# Patient Record
Sex: FEMALE | Race: OTHER | HISPANIC OR LATINO | Employment: OTHER | URBAN - METROPOLITAN AREA
[De-identification: names, ages, dates, MRNs, and addresses within clinical notes are randomized per-mention and may not be internally consistent; named-entity substitution may affect disease eponyms.]

---

## 2024-08-27 ENCOUNTER — HOSPITAL ENCOUNTER (INPATIENT)
Facility: MEDICAL CENTER | Age: 64
LOS: 3 days | End: 2024-08-30
Attending: EMERGENCY MEDICINE | Admitting: HOSPITALIST

## 2024-08-27 ENCOUNTER — APPOINTMENT (OUTPATIENT)
Dept: RADIOLOGY | Facility: MEDICAL CENTER | Age: 64
End: 2024-08-27
Attending: EMERGENCY MEDICINE

## 2024-08-27 DIAGNOSIS — I10 PRIMARY HYPERTENSION: ICD-10-CM

## 2024-08-27 DIAGNOSIS — E87.1 HYPONATREMIA: ICD-10-CM

## 2024-08-27 DIAGNOSIS — R11.2 NAUSEA AND VOMITING, UNSPECIFIED VOMITING TYPE: ICD-10-CM

## 2024-08-27 DIAGNOSIS — R00.1 BRADYCARDIA: ICD-10-CM

## 2024-08-27 PROBLEM — E86.0 DEHYDRATION: Status: ACTIVE | Noted: 2024-08-27

## 2024-08-27 PROBLEM — R94.31 QT PROLONGATION: Status: ACTIVE | Noted: 2024-08-27

## 2024-08-27 PROBLEM — E87.6 HYPOKALEMIA: Status: ACTIVE | Noted: 2024-08-27

## 2024-08-27 LAB
ALBUMIN SERPL BCP-MCNC: 4.4 G/DL (ref 3.2–4.9)
ALBUMIN/GLOB SERPL: 1.2 G/DL
ALP SERPL-CCNC: 89 U/L (ref 30–99)
ALT SERPL-CCNC: 16 U/L (ref 2–50)
AMORPH CRY #/AREA URNS HPF: PRESENT /HPF
ANION GAP SERPL CALC-SCNC: 13 MMOL/L (ref 7–16)
ANION GAP SERPL CALC-SCNC: 14 MMOL/L (ref 7–16)
ANION GAP SERPL CALC-SCNC: 15 MMOL/L (ref 7–16)
APPEARANCE UR: ABNORMAL
AST SERPL-CCNC: 25 U/L (ref 12–45)
BACTERIA #/AREA URNS HPF: ABNORMAL /HPF
BASOPHILS # BLD AUTO: 0.3 % (ref 0–1.8)
BASOPHILS # BLD: 0.02 K/UL (ref 0–0.12)
BILIRUB SERPL-MCNC: 1.1 MG/DL (ref 0.1–1.5)
BILIRUB UR QL STRIP.AUTO: NEGATIVE
BUN SERPL-MCNC: 10 MG/DL (ref 8–22)
BUN SERPL-MCNC: 10 MG/DL (ref 8–22)
BUN SERPL-MCNC: 11 MG/DL (ref 8–22)
CALCIUM ALBUM COR SERPL-MCNC: 9 MG/DL (ref 8.5–10.5)
CALCIUM SERPL-MCNC: 8.5 MG/DL (ref 8.4–10.2)
CALCIUM SERPL-MCNC: 8.8 MG/DL (ref 8.4–10.2)
CALCIUM SERPL-MCNC: 9.3 MG/DL (ref 8.4–10.2)
CHLORIDE SERPL-SCNC: 84 MMOL/L (ref 96–112)
CHLORIDE SERPL-SCNC: 85 MMOL/L (ref 96–112)
CHLORIDE SERPL-SCNC: 88 MMOL/L (ref 96–112)
CO2 SERPL-SCNC: 21 MMOL/L (ref 20–33)
CO2 SERPL-SCNC: 22 MMOL/L (ref 20–33)
CO2 SERPL-SCNC: 25 MMOL/L (ref 20–33)
COLOR UR: YELLOW
CREAT SERPL-MCNC: 0.64 MG/DL (ref 0.5–1.4)
CREAT SERPL-MCNC: 0.65 MG/DL (ref 0.5–1.4)
CREAT SERPL-MCNC: 0.71 MG/DL (ref 0.5–1.4)
EKG IMPRESSION: NORMAL
EOSINOPHIL # BLD AUTO: 0.05 K/UL (ref 0–0.51)
EOSINOPHIL NFR BLD: 0.8 % (ref 0–6.9)
EPI CELLS #/AREA URNS HPF: ABNORMAL /HPF
ERYTHROCYTE [DISTWIDTH] IN BLOOD BY AUTOMATED COUNT: 36.8 FL (ref 35.9–50)
GFR SERPLBLD CREATININE-BSD FMLA CKD-EPI: 95 ML/MIN/1.73 M 2
GFR SERPLBLD CREATININE-BSD FMLA CKD-EPI: 98 ML/MIN/1.73 M 2
GFR SERPLBLD CREATININE-BSD FMLA CKD-EPI: 99 ML/MIN/1.73 M 2
GLOBULIN SER CALC-MCNC: 3.6 G/DL (ref 1.9–3.5)
GLUCOSE SERPL-MCNC: 107 MG/DL (ref 65–99)
GLUCOSE SERPL-MCNC: 115 MG/DL (ref 65–99)
GLUCOSE SERPL-MCNC: 131 MG/DL (ref 65–99)
GLUCOSE UR STRIP.AUTO-MCNC: NEGATIVE MG/DL
HCT VFR BLD AUTO: 38.7 % (ref 37–47)
HGB BLD-MCNC: 14.2 G/DL (ref 12–16)
IMM GRANULOCYTES # BLD AUTO: 0.02 K/UL (ref 0–0.11)
IMM GRANULOCYTES NFR BLD AUTO: 0.3 % (ref 0–0.9)
KETONES UR STRIP.AUTO-MCNC: 40 MG/DL
LEUKOCYTE ESTERASE UR QL STRIP.AUTO: NEGATIVE
LIPASE SERPL-CCNC: 16 U/L (ref 11–82)
LYMPHOCYTES # BLD AUTO: 1.37 K/UL (ref 1–4.8)
LYMPHOCYTES NFR BLD: 20.6 % (ref 22–41)
MCH RBC QN AUTO: 31.4 PG (ref 27–33)
MCHC RBC AUTO-ENTMCNC: 36.7 G/DL (ref 32.2–35.5)
MCV RBC AUTO: 85.6 FL (ref 81.4–97.8)
MICRO URNS: ABNORMAL
MONOCYTES # BLD AUTO: 0.47 K/UL (ref 0–0.85)
MONOCYTES NFR BLD AUTO: 7.1 % (ref 0–13.4)
MUCOUS THREADS #/AREA URNS HPF: ABNORMAL /HPF
NEUTROPHILS # BLD AUTO: 4.73 K/UL (ref 1.82–7.42)
NEUTROPHILS NFR BLD: 70.9 % (ref 44–72)
NITRITE UR QL STRIP.AUTO: NEGATIVE
NRBC # BLD AUTO: 0 K/UL
NRBC BLD-RTO: 0 /100 WBC (ref 0–0.2)
PH UR STRIP.AUTO: 7.5 [PH] (ref 5–8)
PLATELET # BLD AUTO: 273 K/UL (ref 164–446)
PMV BLD AUTO: 11 FL (ref 9–12.9)
POTASSIUM SERPL-SCNC: 3.2 MMOL/L (ref 3.6–5.5)
POTASSIUM SERPL-SCNC: 3.5 MMOL/L (ref 3.6–5.5)
POTASSIUM SERPL-SCNC: 3.7 MMOL/L (ref 3.6–5.5)
PROT SERPL-MCNC: 8 G/DL (ref 6–8.2)
PROT UR QL STRIP: 30 MG/DL
RBC # BLD AUTO: 4.52 M/UL (ref 4.2–5.4)
RBC # URNS HPF: ABNORMAL /HPF
RBC UR QL AUTO: ABNORMAL
SODIUM SERPL-SCNC: 122 MMOL/L (ref 135–145)
SODIUM SERPL-SCNC: 122 MMOL/L (ref 135–145)
SODIUM SERPL-SCNC: 123 MMOL/L (ref 135–145)
SP GR UR STRIP.AUTO: 1.02
WBC # BLD AUTO: 6.7 K/UL (ref 4.8–10.8)
WBC #/AREA URNS HPF: ABNORMAL /HPF

## 2024-08-27 PROCEDURE — 36415 COLL VENOUS BLD VENIPUNCTURE: CPT

## 2024-08-27 PROCEDURE — 93005 ELECTROCARDIOGRAM TRACING: CPT

## 2024-08-27 PROCEDURE — 700102 HCHG RX REV CODE 250 W/ 637 OVERRIDE(OP): Performed by: HOSPITALIST

## 2024-08-27 PROCEDURE — 81001 URINALYSIS AUTO W/SCOPE: CPT

## 2024-08-27 PROCEDURE — 99223 1ST HOSP IP/OBS HIGH 75: CPT | Performed by: HOSPITALIST

## 2024-08-27 PROCEDURE — 80053 COMPREHEN METABOLIC PANEL: CPT

## 2024-08-27 PROCEDURE — 83690 ASSAY OF LIPASE: CPT

## 2024-08-27 PROCEDURE — A9270 NON-COVERED ITEM OR SERVICE: HCPCS | Performed by: HOSPITALIST

## 2024-08-27 PROCEDURE — 700105 HCHG RX REV CODE 258: Performed by: HOSPITALIST

## 2024-08-27 PROCEDURE — 700105 HCHG RX REV CODE 258: Performed by: EMERGENCY MEDICINE

## 2024-08-27 PROCEDURE — 99285 EMERGENCY DEPT VISIT HI MDM: CPT

## 2024-08-27 PROCEDURE — 700111 HCHG RX REV CODE 636 W/ 250 OVERRIDE (IP): Performed by: EMERGENCY MEDICINE

## 2024-08-27 PROCEDURE — 770020 HCHG ROOM/CARE - TELE (206)

## 2024-08-27 PROCEDURE — 94760 N-INVAS EAR/PLS OXIMETRY 1: CPT

## 2024-08-27 PROCEDURE — 85025 COMPLETE CBC W/AUTO DIFF WBC: CPT

## 2024-08-27 PROCEDURE — 74176 CT ABD & PELVIS W/O CONTRAST: CPT

## 2024-08-27 PROCEDURE — 80048 BASIC METABOLIC PNL TOTAL CA: CPT | Mod: 91

## 2024-08-27 RX ORDER — PROMETHAZINE HYDROCHLORIDE 25 MG/1
12.5-25 TABLET ORAL EVERY 4 HOURS PRN
Status: DISCONTINUED | OUTPATIENT
Start: 2024-08-27 | End: 2024-08-30 | Stop reason: HOSPADM

## 2024-08-27 RX ORDER — LOSARTAN POTASSIUM 50 MG/1
25-50 TABLET ORAL 2 TIMES DAILY
COMMUNITY

## 2024-08-27 RX ORDER — CHLORTHALIDONE 50 MG/1
25 TABLET ORAL DAILY
Status: ON HOLD | COMMUNITY
End: 2024-08-29

## 2024-08-27 RX ORDER — AMOXICILLIN 250 MG
2 CAPSULE ORAL 2 TIMES DAILY
Status: DISCONTINUED | OUTPATIENT
Start: 2024-08-27 | End: 2024-08-30 | Stop reason: HOSPADM

## 2024-08-27 RX ORDER — POTASSIUM CHLORIDE 1500 MG/1
40 TABLET, EXTENDED RELEASE ORAL ONCE
Status: COMPLETED | OUTPATIENT
Start: 2024-08-27 | End: 2024-08-27

## 2024-08-27 RX ORDER — SODIUM CHLORIDE 9 MG/ML
INJECTION, SOLUTION INTRAVENOUS CONTINUOUS
Status: ACTIVE | OUTPATIENT
Start: 2024-08-27 | End: 2024-08-28

## 2024-08-27 RX ORDER — ONDANSETRON 2 MG/ML
4 INJECTION INTRAMUSCULAR; INTRAVENOUS EVERY 4 HOURS PRN
Status: DISCONTINUED | OUTPATIENT
Start: 2024-08-27 | End: 2024-08-30 | Stop reason: HOSPADM

## 2024-08-27 RX ORDER — PROMETHAZINE HYDROCHLORIDE 25 MG/1
12.5-25 SUPPOSITORY RECTAL EVERY 4 HOURS PRN
Status: DISCONTINUED | OUTPATIENT
Start: 2024-08-27 | End: 2024-08-30 | Stop reason: HOSPADM

## 2024-08-27 RX ORDER — ONDANSETRON 4 MG/1
4 TABLET, ORALLY DISINTEGRATING ORAL ONCE
Status: COMPLETED | OUTPATIENT
Start: 2024-08-27 | End: 2024-08-27

## 2024-08-27 RX ORDER — PROPRANOLOL HCL 10 MG
5 TABLET ORAL EVERY EVENING
Status: DISCONTINUED | OUTPATIENT
Start: 2024-08-27 | End: 2024-08-30 | Stop reason: HOSPADM

## 2024-08-27 RX ORDER — SODIUM CHLORIDE, SODIUM LACTATE, POTASSIUM CHLORIDE, CALCIUM CHLORIDE 600; 310; 30; 20 MG/100ML; MG/100ML; MG/100ML; MG/100ML
1000 INJECTION, SOLUTION INTRAVENOUS ONCE
Status: COMPLETED | OUTPATIENT
Start: 2024-08-27 | End: 2024-08-27

## 2024-08-27 RX ORDER — PROPRANOLOL HCL 10 MG
5 TABLET ORAL EVERY EVENING
COMMUNITY

## 2024-08-27 RX ORDER — ACETAMINOPHEN 325 MG/1
650 TABLET ORAL EVERY 6 HOURS PRN
Status: DISCONTINUED | OUTPATIENT
Start: 2024-08-27 | End: 2024-08-30 | Stop reason: HOSPADM

## 2024-08-27 RX ORDER — ONDANSETRON 4 MG/1
4 TABLET, ORALLY DISINTEGRATING ORAL EVERY 4 HOURS PRN
Status: DISCONTINUED | OUTPATIENT
Start: 2024-08-27 | End: 2024-08-30 | Stop reason: HOSPADM

## 2024-08-27 RX ORDER — PREGABALIN 75 MG/1
75 CAPSULE ORAL EVERY EVENING
COMMUNITY

## 2024-08-27 RX ORDER — ACETAMINOPHEN 500 MG
500-1000 TABLET ORAL EVERY 6 HOURS PRN
COMMUNITY

## 2024-08-27 RX ORDER — PROCHLORPERAZINE EDISYLATE 5 MG/ML
5-10 INJECTION INTRAMUSCULAR; INTRAVENOUS EVERY 4 HOURS PRN
Status: DISCONTINUED | OUTPATIENT
Start: 2024-08-27 | End: 2024-08-30 | Stop reason: HOSPADM

## 2024-08-27 RX ORDER — POLYETHYLENE GLYCOL 3350 17 G/17G
1 POWDER, FOR SOLUTION ORAL
Status: DISCONTINUED | OUTPATIENT
Start: 2024-08-27 | End: 2024-08-30 | Stop reason: HOSPADM

## 2024-08-27 RX ORDER — HYDRALAZINE HYDROCHLORIDE 20 MG/ML
10 INJECTION INTRAMUSCULAR; INTRAVENOUS EVERY 4 HOURS PRN
Status: DISCONTINUED | OUTPATIENT
Start: 2024-08-27 | End: 2024-08-30 | Stop reason: HOSPADM

## 2024-08-27 RX ORDER — SODIUM CHLORIDE 9 MG/ML
INJECTION, SOLUTION INTRAVENOUS CONTINUOUS
Status: DISCONTINUED | OUTPATIENT
Start: 2024-08-27 | End: 2024-08-27

## 2024-08-27 RX ADMIN — POTASSIUM CHLORIDE 40 MEQ: 1500 TABLET, EXTENDED RELEASE ORAL at 14:17

## 2024-08-27 RX ADMIN — PROPRANOLOL HYDROCHLORIDE 5 MG: 10 TABLET ORAL at 17:04

## 2024-08-27 RX ADMIN — ACETAMINOPHEN 650 MG: 325 TABLET ORAL at 15:48

## 2024-08-27 RX ADMIN — SODIUM CHLORIDE: 9 INJECTION, SOLUTION INTRAVENOUS at 14:17

## 2024-08-27 RX ADMIN — ONDANSETRON 4 MG: 4 TABLET, ORALLY DISINTEGRATING ORAL at 10:45

## 2024-08-27 RX ADMIN — SODIUM CHLORIDE, POTASSIUM CHLORIDE, SODIUM LACTATE AND CALCIUM CHLORIDE 1000 ML: 600; 310; 30; 20 INJECTION, SOLUTION INTRAVENOUS at 11:41

## 2024-08-27 SDOH — ECONOMIC STABILITY: TRANSPORTATION INSECURITY
IN THE PAST 12 MONTHS, HAS THE LACK OF TRANSPORTATION KEPT YOU FROM MEDICAL APPOINTMENTS OR FROM GETTING MEDICATIONS?: NO

## 2024-08-27 SDOH — ECONOMIC STABILITY: TRANSPORTATION INSECURITY
IN THE PAST 12 MONTHS, HAS LACK OF RELIABLE TRANSPORTATION KEPT YOU FROM MEDICAL APPOINTMENTS, MEETINGS, WORK OR FROM GETTING THINGS NEEDED FOR DAILY LIVING?: NO

## 2024-08-27 ASSESSMENT — ENCOUNTER SYMPTOMS
EYE DISCHARGE: 0
MYALGIAS: 0
SHORTNESS OF BREATH: 0
VOMITING: 0
NAUSEA: 1
ABDOMINAL PAIN: 0
EYE REDNESS: 0
FEVER: 0
COUGH: 0
FOCAL WEAKNESS: 0
STRIDOR: 0
BRUISES/BLEEDS EASILY: 0
DIZZINESS: 1
HEADACHES: 1
NERVOUS/ANXIOUS: 0
CHILLS: 0
ROS GI COMMENTS: ANOREXIA
FLANK PAIN: 0

## 2024-08-27 ASSESSMENT — COGNITIVE AND FUNCTIONAL STATUS - GENERAL
SUGGESTED CMS G CODE MODIFIER MOBILITY: CK
MOVING TO AND FROM BED TO CHAIR: A LITTLE
STANDING UP FROM CHAIR USING ARMS: A LITTLE
MOBILITY SCORE: 18
DAILY ACTIVITIY SCORE: 24
WALKING IN HOSPITAL ROOM: A LITTLE
TURNING FROM BACK TO SIDE WHILE IN FLAT BAD: A LITTLE
MOVING FROM LYING ON BACK TO SITTING ON SIDE OF FLAT BED: A LITTLE
CLIMB 3 TO 5 STEPS WITH RAILING: A LITTLE
SUGGESTED CMS G CODE MODIFIER DAILY ACTIVITY: CH

## 2024-08-27 ASSESSMENT — LIFESTYLE VARIABLES
EVER HAD A DRINK FIRST THING IN THE MORNING TO STEADY YOUR NERVES TO GET RID OF A HANGOVER: NO
EVER FELT BAD OR GUILTY ABOUT YOUR DRINKING: NO
HAVE YOU EVER FELT YOU SHOULD CUT DOWN ON YOUR DRINKING: NO
HAVE PEOPLE ANNOYED YOU BY CRITICIZING YOUR DRINKING: NO
TOTAL SCORE: 0
TOTAL SCORE: 0
CONSUMPTION TOTAL: NEGATIVE
AVERAGE NUMBER OF DAYS PER WEEK YOU HAVE A DRINK CONTAINING ALCOHOL: 0
ON A TYPICAL DAY WHEN YOU DRINK ALCOHOL HOW MANY DRINKS DO YOU HAVE: 0
TOTAL SCORE: 0
HOW MANY TIMES IN THE PAST YEAR HAVE YOU HAD 5 OR MORE DRINKS IN A DAY: 0
DOES PATIENT WANT TO STOP DRINKING: CANNOT ASSESS
ALCOHOL_USE: NO

## 2024-08-27 ASSESSMENT — SOCIAL DETERMINANTS OF HEALTH (SDOH)

## 2024-08-27 ASSESSMENT — FIBROSIS 4 INDEX: FIB4 SCORE: 1.47

## 2024-08-27 ASSESSMENT — PATIENT HEALTH QUESTIONNAIRE - PHQ9
2. FEELING DOWN, DEPRESSED, IRRITABLE, OR HOPELESS: NOT AT ALL
SUM OF ALL RESPONSES TO PHQ9 QUESTIONS 1 AND 2: 0
1. LITTLE INTEREST OR PLEASURE IN DOING THINGS: NOT AT ALL

## 2024-08-27 ASSESSMENT — PAIN DESCRIPTION - PAIN TYPE
TYPE: ACUTE PAIN
TYPE: ACUTE PAIN

## 2024-08-27 NOTE — ASSESSMENT & PLAN NOTE
I will resume propranolol with hold parameters.  I will start as needed hydralazine for extreme hypertension.    8/29: Resume losartan and continue propranolol.  Continue to hold chlorthalidone.

## 2024-08-27 NOTE — ED PROVIDER NOTES
ED Provider Note    CHIEF COMPLAINT  Chief Complaint   Patient presents with    N/V     For the last 3-4 days      Loss of Appetite     For about 3-4 days     Weakness     For about 3-4 days      Lightheadedness     For the last 3-4 days       EXTERNAL RECORDS REVIEWED  [place] notable [place]    HPI/ROS  LIMITATION TO HISTORY   Select: : None  OUTSIDE HISTORIAN(S):  Daughter at bedside provides additional details that patient has multiple medications that she brought with her from [facility] but is not taking any of them she brought them as a precaution.        Rebecca Zamora is a 64 y.o. female who presents emergency department with chief complaint of nausea and vomiting.  Daughter reports that the patient's been unable to tolerate any food or fluids for several days and has had severe nausea no emesis no diarrhea no constipation denies any urinary pain she reports some pressure in her lower abdomen but denies overt abdominal pain.  Patient does have a history of hypertension for which she takes losartan.  She takes occasional propranolol for anxiety.  Reports no previous abdominal surgeries.    PAST MEDICAL HISTORY   has a past medical history of Hypertension.    SURGICAL HISTORY   has a past surgical history that includes other and other orthopedic surgery.    FAMILY HISTORY  History reviewed. No pertinent family history.    SOCIAL HISTORY  Social History     Tobacco Use    Smoking status: Never    Smokeless tobacco: Never   Vaping Use    Vaping status: Never Used   Substance and Sexual Activity    Alcohol use: Never    Drug use: Never    Sexual activity: Not on file       CURRENT MEDICATIONS  Home Medications       Reviewed by Shanthi Zeng R.N. (Registered Nurse) on 08/27/24 at 0980  Med List Status: Partial     Medication Last Dose Status        Patient Garth Taking any Medications                           ALLERGIES  Allergies   Allergen Reactions    Penicillins  "Unspecified     \"Buzzing in ears when she took it\"        PHYSICAL EXAM  VITAL SIGNS: /71   Pulse 62   Temp 36.6 °C (97.8 °F) (Temporal)   Resp 18   Ht 1.53 m (5' 0.24\")   Wt 56.2 kg (123 lb 14.4 oz)   SpO2 97%   BMI 24.01 kg/m²    Pulse ox interpretation: I interpret this pulse ox as normal.  Constitutional: Alert and oriented x 3, Distress  HEENT: Atraumatic normocephalic, pupils are equal round reactive to light extraocular movements are intact. The nares is clear, external ears are normal, mouth shows moist mucous membranes normal dentition for age  Neck: Supple, no JVD no tracheal deviation  Cardiovascular: Regular rate and rhythm no murmur rub or gallop 2+ pulses peripherally x4  Thorax & Lungs: No respiratory distress, no wheezes rales or rhonchi, No chest tenderness.   GI: Soft nontender nondistended positive bowel sounds, no peritoneal signs  Skin: Warm dry no acute rash or lesion  Musculoskeletal: Moving all extremities with full range and 5 of 5 strength no acute  deformity  Neurologic: Cranial nerves III through XII are grossly intact no sensory deficit no cerebellar dysfunction   Psychiatric: Appropriate affect for situation at this time          EKG/LABS  Results for orders placed or performed during the hospital encounter of 08/27/24   URINALYSIS CULTURE, IF INDICATED    Specimen: Urine, Clean Catch   Result Value Ref Range    Color Yellow     Character Hazy (A)     Specific Gravity 1.020 <1.035    Ph 7.5 5.0 - 8.0    Glucose Negative Negative mg/dL    Ketones 40 (A) Negative mg/dL    Protein 30 (A) Negative mg/dL    Bilirubin Negative Negative    Nitrite Negative Negative    Leukocyte Esterase Negative Negative    Occult Blood Trace (A) Negative    Micro Urine Req Microscopic    URINE MICROSCOPIC (W/UA)   Result Value Ref Range    WBC 0-2 /hpf    RBC 2-5 (A) /hpf    Bacteria Few (A) None /hpf    Epithelial Cells Few Few /hpf    Mucous Threads Many /hpf    Amorphous Crystal Present /hpf "   CBC WITH DIFFERENTIAL   Result Value Ref Range    WBC 6.7 4.8 - 10.8 K/uL    RBC 4.52 4.20 - 5.40 M/uL    Hemoglobin 14.2 12.0 - 16.0 g/dL    Hematocrit 38.7 37.0 - 47.0 %    MCV 85.6 81.4 - 97.8 fL    MCH 31.4 27.0 - 33.0 pg    MCHC 36.7 (H) 32.2 - 35.5 g/dL    RDW 36.8 35.9 - 50.0 fL    Platelet Count 273 164 - 446 K/uL    MPV 11.0 9.0 - 12.9 fL    Neutrophils-Polys 70.90 44.00 - 72.00 %    Lymphocytes 20.60 (L) 22.00 - 41.00 %    Monocytes 7.10 0.00 - 13.40 %    Eosinophils 0.80 0.00 - 6.90 %    Basophils 0.30 0.00 - 1.80 %    Immature Granulocytes 0.30 0.00 - 0.90 %    Nucleated RBC 0.00 0.00 - 0.20 /100 WBC    Neutrophils (Absolute) 4.73 1.82 - 7.42 K/uL    Lymphs (Absolute) 1.37 1.00 - 4.80 K/uL    Monos (Absolute) 0.47 0.00 - 0.85 K/uL    Eos (Absolute) 0.05 0.00 - 0.51 K/uL    Baso (Absolute) 0.02 0.00 - 0.12 K/uL    Immature Granulocytes (abs) 0.02 0.00 - 0.11 K/uL    NRBC (Absolute) 0.00 K/uL   COMP METABOLIC PANEL   Result Value Ref Range    Sodium 123 (L) 135 - 145 mmol/L    Potassium 3.2 (L) 3.6 - 5.5 mmol/L    Chloride 84 (L) 96 - 112 mmol/L    Co2 25 20 - 33 mmol/L    Anion Gap 14.0 7.0 - 16.0    Glucose 131 (H) 65 - 99 mg/dL    Bun 10 8 - 22 mg/dL    Creatinine 0.71 0.50 - 1.40 mg/dL    Calcium 9.3 8.4 - 10.2 mg/dL    Correct Calcium 9.0 8.5 - 10.5 mg/dL    AST(SGOT) 25 12 - 45 U/L    ALT(SGPT) 16 2 - 50 U/L    Alkaline Phosphatase 89 30 - 99 U/L    Total Bilirubin 1.1 0.1 - 1.5 mg/dL    Albumin 4.4 3.2 - 4.9 g/dL    Total Protein 8.0 6.0 - 8.2 g/dL    Globulin 3.6 (H) 1.9 - 3.5 g/dL    A-G Ratio 1.2 g/dL   LIPASE   Result Value Ref Range    Lipase 16 11 - 82 U/L   ESTIMATED GFR   Result Value Ref Range    GFR (CKD-EPI) 95 >60 mL/min/1.73 m 2   EKG   Result Value Ref Range    Report       [facility]    Test Date:  2024-08-27  Pt Name:    ANDERS JOHNSON ZAIDI      Department: [facility]  MRN:        [id number]                      Room:       1107  Gender:      Female                       Technician: [id number]  :        1960                   Requested By:ER TRIAGE PROTOCOL  Order #:    [id number]                    Reading MD: MUNIR ORANTES MD    Measurements  Intervals                                Axis  Rate:       59                           P:          77  WA:         153                          QRS:        69  QRSD:       110                          T:          59  QT:         460  QTc:        456    Interpretive Statements  Sinus rhythm  Borderline ST elevation, anterior leads  Baseline wander in lead(s) II,III,aVF  No previous ECG available for comparison  Electronically Signed On 2024 16:27:44 PDT by MUNIR ORANETS MD        I have independently interpreted this EKG    RADIOLOGY/PROCEDURES   I have independently interpreted the diagnostic imaging associated with this visit and am waiting the final reading from the radiologist.   My preliminary interpretation is as follows:     Radiologist interpretation:  CT-RENAL COLIC EVALUATION(A/P W/O)   Final Result         1. There is no renal or definite ureteral calculus. There is no hydronephrosis.   2. Atherosclerosis including coronary artery disease.          COURSE & MEDICAL DECISION MAKING    ASSESSMENT, COURSE AND PLAN  Care Narrative: Very pleasant 64-year-old female with dizziness nausea vomiting of the last several days.  She is found to be hyponatremic to 123.  Otherwise her workup here is fairly reassuring.  I have discussed case up to this point with hospitalist Dr. Shelton and patient's admitted in guarded condition.              FINAL DIAGNOSIS  1. Hyponatremia Active   2. Nausea and vomiting, unspecified vomiting type Active        Electronically signed by: Munir Orantes M.D.

## 2024-08-27 NOTE — H&P
Hospital Medicine History & Physical Note    Date of Service  8/27/2024    Primary Care Physician  Pcp Pt States None    Consultants  None     Code Status  Full Code    Chief Complaint  Chief Complaint   Patient presents with    N/V     For the last 3-4 days      Loss of Appetite     For about 3-4 days     Weakness     For about 3-4 days      Lightheadedness     For the last 3-4 days     History of Presenting Illness  Rebecca Zamora is a 64 y.o. female with a past medical history of primary hypertension and hyperlipidemia who presented 8/27/2024 with generalized weakness, anorexia, headache, dizziness and nausea and vomiting.  The patient has not been feeling well over the past 4 days.  She has been having progressive worsening generalized weakness dizziness with loss of appetite.  She denies having abdominal pain or diarrhea.  She denies noticing any blood in her vomitus.  In the emergency room she was found to have marked dehydration and hyponatremia with a sodium level of 123     I discussed the plan of care with emergency physician, the patient and patient family present at bedside in the emergency room    Review of Systems  Review of Systems   Constitutional:  Positive for malaise/fatigue. Negative for chills and fever.   Eyes:  Negative for discharge and redness.   Respiratory:  Negative for cough, shortness of breath and stridor.    Cardiovascular:  Negative for chest pain and leg swelling.   Gastrointestinal:  Positive for nausea. Negative for abdominal pain and vomiting.        Anorexia   Genitourinary:  Negative for flank pain.   Musculoskeletal:  Negative for myalgias.   Skin: Negative.    Neurological:  Positive for dizziness and headaches. Negative for focal weakness.   Endo/Heme/Allergies:  Does not bruise/bleed easily.   Psychiatric/Behavioral:  The patient is not nervous/anxious.      Past Medical History   has a past medical history of Hypertension.    Surgical History   has a past surgical  "history that includes other and other orthopedic surgery.     Family History  family history is not on file.      Social History   reports that she has never smoked. She has never used smokeless tobacco. She reports that she does not drink alcohol and does not use drugs.    Allergies  Allergies   Allergen Reactions    Penicillins Unspecified     \"Buzzing in ears when she took it\"      Medications  Prior to Admission Medications   Prescriptions Last Dose Informant Patient Reported? Taking?   acetaminophen (TYLENOL) 500 MG Tab 8/26/2024 at 0900 Patient Yes Yes   Sig: Take 500-1,000 mg by mouth every 6 hours as needed. Indications: Pain   chlorthalidone (HYGROTON) 50 MG Tab 8/26/2024 at 0900 Rx Bottle (For Med Information) Yes Yes   Sig: Take 25 mg by mouth every day. Pt takes half tablet (25MG)   losartan (COZAAR) 50 MG Tab 8/26/2024 at 2100 Rx Bottle (For Med Information) Yes Yes   Sig: Take 25-50 mg by mouth 2 times a day. Pt takes 50MG in AM and 25MG in the evening   pregabalin (LYRICA) 75 MG Cap > 2 nights at PM Rx Bottle (For Med Information) Yes Yes   Sig: Take 75 mg by mouth every evening.   propranolol (INDERAL) 10 MG Tab 8/26/2024 at 2100 Rx Bottle (For Med Information) Yes Yes   Sig: Take 5 mg by mouth every evening.      Facility-Administered Medications: None     Physical Exam  Temp:  [36.6 °C (97.8 °F)-36.8 °C (98.3 °F)] 36.8 °C (98.3 °F)  Pulse:  [56-65] 65  Resp:  [12-20] 16  BP: (123-160)/() 142/79  SpO2:  [97 %-100 %] 98 %  Blood Pressure: 136/68   Temperature: 36.6 °C (97.8 °F)   Pulse: (!) 57   Respiration: 13   Pulse Oximetry: 99 %     Physical Exam  Constitutional:       General: She is not in acute distress.  HENT:      Head: Normocephalic and atraumatic.      Right Ear: External ear normal.      Left Ear: External ear normal.      Nose: No congestion or rhinorrhea.      Mouth/Throat:      Mouth: Mucous membranes are moist.      Pharynx: No oropharyngeal exudate or posterior oropharyngeal " "erythema.   Eyes:      General: No scleral icterus.        Right eye: No discharge.         Left eye: No discharge.      Conjunctiva/sclera: Conjunctivae normal.      Pupils: Pupils are equal, round, and reactive to light.   Cardiovascular:      Rate and Rhythm: Bradycardia present.      Heart sounds:      No friction rub. No gallop.   Pulmonary:      Effort: Pulmonary effort is normal.      Comments: Saturating well on room air  Abdominal:      General: Abdomen is flat. There is no distension.   Musculoskeletal:         General: No swelling.      Cervical back: Neck supple. No rigidity. No muscular tenderness.      Right lower leg: No edema.      Left lower leg: No edema.   Skin:     Capillary Refill: Capillary refill takes 2 to 3 seconds.      Coloration: Skin is not jaundiced or pale.      Findings: No bruising or erythema.   Neurological:      Mental Status: She is alert and oriented to person, place, and time.      Comments: Power is 545 in both upper and lower extremities.  Fluent speech   Psychiatric:         Mood and Affect: Mood normal.         Judgment: Judgment normal.       Laboratory:  Recent Labs     08/27/24  1032   WBC 6.7   RBC 4.52   HEMOGLOBIN 14.2   HEMATOCRIT 38.7   MCV 85.6   MCH 31.4   MCHC 36.7*   RDW 36.8   PLATELETCT 273   MPV 11.0     Recent Labs     08/27/24  1032 08/27/24  1732   SODIUM 123* 122*   POTASSIUM 3.2* 3.5*   CHLORIDE 84* 85*   CO2 25 22   GLUCOSE 131* 115*   BUN 10 10   CREATININE 0.71 0.64   CALCIUM 9.3 8.8     Recent Labs     08/27/24  1032 08/27/24  1732   ALTSGPT 16  --    ASTSGOT 25  --    ALKPHOSPHAT 89  --    TBILIRUBIN 1.1  --    LIPASE 16  --    GLUCOSE 131* 115*         No results for input(s): \"NTPROBNP\" in the last 72 hours.      No results for input(s): \"TROPONINT\" in the last 72 hours.    Imaging:  CT-RENAL COLIC EVALUATION(A/P W/O)   Final Result         1. There is no renal or definite ureteral calculus. There is no hydronephrosis.   2. Atherosclerosis " including coronary artery disease.        I personally reviewed patient's EKG shows sinus bradycardia with a rate of 59.  There is no ST elevation.  There is flattening of T waves in lead aVL.  There is no block.  QTc is 456    Assessment/Plan:  Justification for Admission Status  I anticipate this patient will require at least two midnights for appropriate medical management, necessitating inpatient admission because patient has hyponatremia.  Will require intravenous fluids and close monitoring to ensure slow correction    Patient will need a telemetry bed on medical service      * Hyponatremia- (present on admission)  Assessment & Plan  Hypovolemic hyponatremia, losartan and chlorthalidone use  I will hold home losartan and chlorthalidone for now given her hyponatremia and dehydration   I expect Na to improve with IVF, and holding diuretics  I am ordering a BMPs every 4 hours, avoid over correction, aim for 8-10 meq correction in 24 hours   1200 mL free water restriction, okay for solute-containing fluids like Gatorade       QT prolongation- (present on admission)  Assessment & Plan  EKG shows QTc prolongation of 456.  Likely secondary to hypokalemia and hypomagnesemia  I will place on continuous cardiac monitoring.  Try to avoid QT prolonging medications as much as possible  Continue to monitor electrolytes, optimize potassium to be above 4 & magnesium to be above 2    I will order follow-up magnesium and potassium levels     Bradycardia- (present on admission)  Assessment & Plan  Has dizziness but this is likely secondary to severe dehydration.  EKG shows sinus bradycardia with a rate of 59.  There is no ST elevation.  There is flattening of T waves in lead aVL.  There is no block.  QTc is 456  Bradycardia is likely secondary to taking propranolol  I will monitor on telemetry    Primary hypertension- (present on admission)  Assessment & Plan  I will resume propranolol with hold parameters.  I will start as  needed hydralazine for extreme hypertension.  I will hold home losartan and chlorthalidone for now given her hyponatremia and dehydration    Hypokalemia- (present on admission)  Assessment & Plan  Replacing, checking magnesium    I will hold home diuretics for now  Continue to monitor replace as needed     Dehydration- (present on admission)  Assessment & Plan  Likely secondary to reduced oral intake, and increase in insensible loss secondary to vomiting  Encourage oral intake as tolerated, antiemetics as needed.  Intravenous hydration until adequate oral intake is achieved.       VTE prophylaxis: SCDs/TEDs and Xarelto 10 mg daily as prophylaxis

## 2024-08-27 NOTE — ASSESSMENT & PLAN NOTE
EKG shows QTc prolongation of 456.  Likely secondary to hypokalemia and hypomagnesemia  I will place on continuous cardiac monitoring.  Try to avoid QT prolonging medications as much as possible  Continue to monitor electrolytes, optimize potassium to be above 4 & magnesium to be above 2    I will order follow-up magnesium and potassium levels

## 2024-08-27 NOTE — ED NOTES
Medication history reviewed with pt. Med rec is complete.  Allergies reviewed, per pt  Interviewed pt with niece at bedside with permission from pt.    Pts niece translated for pt, pt had RX bottles at bedside. Went over all RX bottles and returned RX bottles back to pts niece at bedside.  Pt has ATORVASTATIN 20MG, but has not taken this medications in over 3 months.    Patient has not had any outpatient antibiotics in the last 30 days.    Pt is not on any anticoagulants

## 2024-08-27 NOTE — ASSESSMENT & PLAN NOTE
Has dizziness but this is likely secondary to severe dehydration.    8/28: Continue monitor on telemetry

## 2024-08-27 NOTE — ED TRIAGE NOTES
"Pt comes in w/ family   here visiting from [place]    started about 3-4 days ago N/V  unable to keep anything down   now has become lightheaded and weak due to loss of appetite   same thing happened to her last time she came to visit a couple of yrs ago   denies pain however is having \"soreness to stomach\"   "

## 2024-08-27 NOTE — ASSESSMENT & PLAN NOTE
Hypovolemic hyponatremia, losartan and chlorthalidone use    8/28: I suspect the patient with hypovolemic hyponatremia.  We have consulted nephrology, appreciate for recommendations.  For now we will continue to monitor BMP and continue with IV fluids.    8/29: Sodium seems to be improving.  We appreciate further recommendations per nephrology.

## 2024-08-27 NOTE — ASSESSMENT & PLAN NOTE
Likely secondary to reduced oral intake, and increase in insensible loss secondary to vomiting  Encourage oral intake as tolerated, antiemetics as needed.  Intravenous hydration until adequate oral intake is achieved.

## 2024-08-28 PROBLEM — Z71.89 ADVANCE CARE PLANNING: Status: ACTIVE | Noted: 2024-08-28

## 2024-08-28 PROBLEM — R53.1 GENERALIZED WEAKNESS: Status: ACTIVE | Noted: 2024-08-28

## 2024-08-28 PROBLEM — E83.42 HYPOMAGNESEMIA: Status: ACTIVE | Noted: 2024-08-28

## 2024-08-28 LAB
ALBUMIN SERPL BCP-MCNC: 3.9 G/DL (ref 3.2–4.9)
ALBUMIN/GLOB SERPL: 1.5 G/DL
ALP SERPL-CCNC: 80 U/L (ref 30–99)
ALT SERPL-CCNC: 16 U/L (ref 2–50)
ANION GAP SERPL CALC-SCNC: 11 MMOL/L (ref 7–16)
ANION GAP SERPL CALC-SCNC: 12 MMOL/L (ref 7–16)
ANION GAP SERPL CALC-SCNC: 13 MMOL/L (ref 7–16)
ANION GAP SERPL CALC-SCNC: 14 MMOL/L (ref 7–16)
ANION GAP SERPL CALC-SCNC: 14 MMOL/L (ref 7–16)
AST SERPL-CCNC: 22 U/L (ref 12–45)
BILIRUB SERPL-MCNC: 1 MG/DL (ref 0.1–1.5)
BUN SERPL-MCNC: 10 MG/DL (ref 8–22)
BUN SERPL-MCNC: 7 MG/DL (ref 8–22)
BUN SERPL-MCNC: 8 MG/DL (ref 8–22)
BUN SERPL-MCNC: 9 MG/DL (ref 8–22)
BUN SERPL-MCNC: 9 MG/DL (ref 8–22)
CALCIUM ALBUM COR SERPL-MCNC: 8.7 MG/DL (ref 8.5–10.5)
CALCIUM SERPL-MCNC: 8.1 MG/DL (ref 8.4–10.2)
CALCIUM SERPL-MCNC: 8.4 MG/DL (ref 8.4–10.2)
CALCIUM SERPL-MCNC: 8.5 MG/DL (ref 8.4–10.2)
CALCIUM SERPL-MCNC: 8.5 MG/DL (ref 8.4–10.2)
CALCIUM SERPL-MCNC: 8.6 MG/DL (ref 8.4–10.2)
CHLORIDE SERPL-SCNC: 89 MMOL/L (ref 96–112)
CHLORIDE SERPL-SCNC: 91 MMOL/L (ref 96–112)
CHLORIDE SERPL-SCNC: 92 MMOL/L (ref 96–112)
CHLORIDE SERPL-SCNC: 92 MMOL/L (ref 96–112)
CHLORIDE SERPL-SCNC: 96 MMOL/L (ref 96–112)
CO2 SERPL-SCNC: 18 MMOL/L (ref 20–33)
CO2 SERPL-SCNC: 19 MMOL/L (ref 20–33)
CO2 SERPL-SCNC: 20 MMOL/L (ref 20–33)
CO2 SERPL-SCNC: 21 MMOL/L (ref 20–33)
CO2 SERPL-SCNC: 21 MMOL/L (ref 20–33)
CREAT SERPL-MCNC: 0.59 MG/DL (ref 0.5–1.4)
CREAT SERPL-MCNC: 0.62 MG/DL (ref 0.5–1.4)
CREAT SERPL-MCNC: 0.62 MG/DL (ref 0.5–1.4)
CREAT SERPL-MCNC: 0.69 MG/DL (ref 0.5–1.4)
CREAT SERPL-MCNC: 0.71 MG/DL (ref 0.5–1.4)
ERYTHROCYTE [DISTWIDTH] IN BLOOD BY AUTOMATED COUNT: 36.4 FL (ref 35.9–50)
GFR SERPLBLD CREATININE-BSD FMLA CKD-EPI: 100 ML/MIN/1.73 M 2
GFR SERPLBLD CREATININE-BSD FMLA CKD-EPI: 95 ML/MIN/1.73 M 2
GFR SERPLBLD CREATININE-BSD FMLA CKD-EPI: 97 ML/MIN/1.73 M 2
GFR SERPLBLD CREATININE-BSD FMLA CKD-EPI: 99 ML/MIN/1.73 M 2
GFR SERPLBLD CREATININE-BSD FMLA CKD-EPI: 99 ML/MIN/1.73 M 2
GLOBULIN SER CALC-MCNC: 2.6 G/DL (ref 1.9–3.5)
GLUCOSE SERPL-MCNC: 102 MG/DL (ref 65–99)
GLUCOSE SERPL-MCNC: 110 MG/DL (ref 65–99)
GLUCOSE SERPL-MCNC: 117 MG/DL (ref 65–99)
GLUCOSE SERPL-MCNC: 174 MG/DL (ref 65–99)
GLUCOSE SERPL-MCNC: 98 MG/DL (ref 65–99)
HCT VFR BLD AUTO: 34.9 % (ref 37–47)
HGB BLD-MCNC: 13 G/DL (ref 12–16)
MAGNESIUM SERPL-MCNC: 1.7 MG/DL (ref 1.5–2.5)
MCH RBC QN AUTO: 31.3 PG (ref 27–33)
MCHC RBC AUTO-ENTMCNC: 37.2 G/DL (ref 32.2–35.5)
MCV RBC AUTO: 83.9 FL (ref 81.4–97.8)
PLATELET # BLD AUTO: 216 K/UL (ref 164–446)
PMV BLD AUTO: 10.3 FL (ref 9–12.9)
POTASSIUM SERPL-SCNC: 3.4 MMOL/L (ref 3.6–5.5)
POTASSIUM SERPL-SCNC: 3.5 MMOL/L (ref 3.6–5.5)
POTASSIUM SERPL-SCNC: 3.6 MMOL/L (ref 3.6–5.5)
POTASSIUM SERPL-SCNC: 3.8 MMOL/L (ref 3.6–5.5)
POTASSIUM SERPL-SCNC: 3.9 MMOL/L (ref 3.6–5.5)
PROT SERPL-MCNC: 6.5 G/DL (ref 6–8.2)
RBC # BLD AUTO: 4.16 M/UL (ref 4.2–5.4)
SODIUM SERPL-SCNC: 123 MMOL/L (ref 135–145)
SODIUM SERPL-SCNC: 124 MMOL/L (ref 135–145)
SODIUM SERPL-SCNC: 124 MMOL/L (ref 135–145)
SODIUM SERPL-SCNC: 125 MMOL/L (ref 135–145)
SODIUM SERPL-SCNC: 127 MMOL/L (ref 135–145)
WBC # BLD AUTO: 6.8 K/UL (ref 4.8–10.8)

## 2024-08-28 PROCEDURE — 700102 HCHG RX REV CODE 250 W/ 637 OVERRIDE(OP): Mod: JZ | Performed by: INTERNAL MEDICINE

## 2024-08-28 PROCEDURE — 97165 OT EVAL LOW COMPLEX 30 MIN: CPT

## 2024-08-28 PROCEDURE — 770020 HCHG ROOM/CARE - TELE (206)

## 2024-08-28 PROCEDURE — A9270 NON-COVERED ITEM OR SERVICE: HCPCS | Mod: JZ | Performed by: INTERNAL MEDICINE

## 2024-08-28 PROCEDURE — 83735 ASSAY OF MAGNESIUM: CPT

## 2024-08-28 PROCEDURE — 700111 HCHG RX REV CODE 636 W/ 250 OVERRIDE (IP): Performed by: INTERNAL MEDICINE

## 2024-08-28 PROCEDURE — 700105 HCHG RX REV CODE 258: Performed by: HOSPITALIST

## 2024-08-28 PROCEDURE — 700105 HCHG RX REV CODE 258: Performed by: INTERNAL MEDICINE

## 2024-08-28 PROCEDURE — 700102 HCHG RX REV CODE 250 W/ 637 OVERRIDE(OP): Performed by: HOSPITALIST

## 2024-08-28 PROCEDURE — A9270 NON-COVERED ITEM OR SERVICE: HCPCS | Performed by: HOSPITALIST

## 2024-08-28 PROCEDURE — 80053 COMPREHEN METABOLIC PANEL: CPT

## 2024-08-28 PROCEDURE — 84300 ASSAY OF URINE SODIUM: CPT

## 2024-08-28 PROCEDURE — 80048 BASIC METABOLIC PNL TOTAL CA: CPT | Mod: 91

## 2024-08-28 PROCEDURE — 36415 COLL VENOUS BLD VENIPUNCTURE: CPT

## 2024-08-28 PROCEDURE — 700111 HCHG RX REV CODE 636 W/ 250 OVERRIDE (IP): Performed by: HOSPITALIST

## 2024-08-28 PROCEDURE — 99233 SBSQ HOSP IP/OBS HIGH 50: CPT | Mod: 25 | Performed by: INTERNAL MEDICINE

## 2024-08-28 PROCEDURE — 97535 SELF CARE MNGMENT TRAINING: CPT

## 2024-08-28 PROCEDURE — 85027 COMPLETE CBC AUTOMATED: CPT

## 2024-08-28 PROCEDURE — 83935 ASSAY OF URINE OSMOLALITY: CPT

## 2024-08-28 PROCEDURE — 99254 IP/OBS CNSLTJ NEW/EST MOD 60: CPT | Performed by: INTERNAL MEDICINE

## 2024-08-28 PROCEDURE — 94760 N-INVAS EAR/PLS OXIMETRY 1: CPT

## 2024-08-28 PROCEDURE — 99497 ADVNCD CARE PLAN 30 MIN: CPT | Performed by: INTERNAL MEDICINE

## 2024-08-28 RX ORDER — MAGNESIUM SULFATE HEPTAHYDRATE 40 MG/ML
2 INJECTION, SOLUTION INTRAVENOUS ONCE
Status: COMPLETED | OUTPATIENT
Start: 2024-08-28 | End: 2024-08-28

## 2024-08-28 RX ORDER — POTASSIUM CHLORIDE 1500 MG/1
40 TABLET, EXTENDED RELEASE ORAL ONCE
Status: COMPLETED | OUTPATIENT
Start: 2024-08-28 | End: 2024-08-28

## 2024-08-28 RX ORDER — SODIUM CHLORIDE 9 MG/ML
INJECTION, SOLUTION INTRAVENOUS CONTINUOUS
Status: DISCONTINUED | OUTPATIENT
Start: 2024-08-28 | End: 2024-08-30 | Stop reason: HOSPADM

## 2024-08-28 RX ADMIN — POTASSIUM CHLORIDE 40 MEQ: 1500 TABLET, EXTENDED RELEASE ORAL at 13:51

## 2024-08-28 RX ADMIN — ACETAMINOPHEN 650 MG: 325 TABLET ORAL at 17:48

## 2024-08-28 RX ADMIN — ONDANSETRON 4 MG: 4 TABLET, ORALLY DISINTEGRATING ORAL at 11:49

## 2024-08-28 RX ADMIN — SODIUM CHLORIDE: 9 INJECTION, SOLUTION INTRAVENOUS at 16:00

## 2024-08-28 RX ADMIN — SENNOSIDES AND DOCUSATE SODIUM 2 TABLET: 50; 8.6 TABLET ORAL at 17:48

## 2024-08-28 RX ADMIN — ONDANSETRON 4 MG: 4 TABLET, ORALLY DISINTEGRATING ORAL at 04:03

## 2024-08-28 RX ADMIN — RIVAROXABAN 10 MG: 10 TABLET, FILM COATED ORAL at 17:48

## 2024-08-28 RX ADMIN — MAGNESIUM SULFATE HEPTAHYDRATE 2 G: 2 INJECTION, SOLUTION INTRAVENOUS at 13:51

## 2024-08-28 RX ADMIN — SODIUM CHLORIDE: 9 INJECTION, SOLUTION INTRAVENOUS at 03:37

## 2024-08-28 RX ADMIN — PROPRANOLOL HYDROCHLORIDE 5 MG: 10 TABLET ORAL at 17:48

## 2024-08-28 ASSESSMENT — ENCOUNTER SYMPTOMS
COUGH: 0
SHORTNESS OF BREATH: 0
FALLS: 0
NERVOUS/ANXIOUS: 0
DOUBLE VISION: 0
ROS GI COMMENTS: NO APPETITE
BACK PAIN: 0
CHILLS: 0
ABDOMINAL PAIN: 0
HEARTBURN: 0
WEAKNESS: 1
PALPITATIONS: 0
BLURRED VISION: 0
NAUSEA: 1
FEVER: 0
HEADACHES: 0

## 2024-08-28 ASSESSMENT — LIFESTYLE VARIABLES: SUBSTANCE_ABUSE: 0

## 2024-08-28 ASSESSMENT — COGNITIVE AND FUNCTIONAL STATUS - GENERAL
SUGGESTED CMS G CODE MODIFIER DAILY ACTIVITY: CH
DAILY ACTIVITIY SCORE: 24

## 2024-08-28 ASSESSMENT — PAIN DESCRIPTION - PAIN TYPE
TYPE: ACUTE PAIN
TYPE: ACUTE PAIN

## 2024-08-28 ASSESSMENT — ACTIVITIES OF DAILY LIVING (ADL): TOILETING: INDEPENDENT

## 2024-08-28 ASSESSMENT — GAIT ASSESSMENTS: DISTANCE (FEET): 25

## 2024-08-28 ASSESSMENT — FIBROSIS 4 INDEX: FIB4 SCORE: 1.63

## 2024-08-28 NOTE — PROGRESS NOTES
Telemetry Shift Summary    Rhythm SB-SR  HR Range 46-61  Ectopy R PVC, Coup, ST elevation in V1 V2     Measurements 0.16/0.08/0.44      Normal Values  Rhythm SR  HR Range:   Measurements: 0.12-0.20/0.06-0.10/0.30-0.52

## 2024-08-28 NOTE — THERAPY
"Occupational Therapy   Initial Evaluation     Patient Name: Rebecca Zamora  Age:  64 y.o., Sex:  female  Medical Record #: [id number]  Today's Date: 8/28/2024          Assessment  Patient is 64 y.o. female presented 8/27 w/ generalized weakness, anorexia, headache, dizziness and vomiting.  Admitted with a diagnosis of hyponatremia.  PMH - HTN, HLD,  Pt resides in [place] and is visiting her sister in [place], [place].  Her home in [place] is 2 stories, PLOF Indep for ADL's, transfers and ambulation w/out a device.  Before she returns home she will stay with her sister in [place], [place].  Sister resides in a mobile home w/ 4 steps and Left rail.  Sister is available to assist as needed while in [place] and extensive family available to assist upon return to [place].  Pt demonstrated Indep w/ bed mobility, transfers, functional mobility w/out a device in room and ADL's.  No LOB, coordination and strength WFL's.      Plan    DC Equipment Recommendations: (P) None  Discharge Recommendations: (P) Anticipate that the patient will have no further occupational therapy needs after discharge from the hospital     Subjective  \"The smell of food makes me want to throw up\"  Pt was alert and cooperative w/ tx.     Objective       08/28/24 0910    Services   Is patient using  services for this encounter? Yes   Language Interpreted Korean   Initial Contact Note    Initial Contact Note Order Received and Verified, Occupational Therapy Evaluation in Progress with Full Report to Follow.   Prior Living Situation   Prior Services None   Housing / Facility 2 Story House   Steps Into Home 2   Steps In Home   (FOS)   Bathroom Set up Bathtub / Shower Combination   Equipment Owned None   Lives with - Patient's Self Care Capacity Alone and Able to Care For Self   Comments Pt resides in [place] and is visiting her sister in [place], [place].  Her home in [place] is 2 stories, PLOF Indep for ADL's, transfers and ambulation w/out a device.  " Before she returns home she will stay with her sister in [place], [place].  Sister resides in a mobile home w/ 4 steps and Left rail.  Sister is available to assist as needed while in [place] and extensive family available to assist upon return to [place].   Prior Level of ADL Function   Self Feeding Independent   Grooming / Hygiene Independent   Bathing Independent   Dressing Independent   Toileting Independent   Prior Level of IADL Function   Medication Management Independent   Laundry Independent   Kitchen Mobility Independent   Finances Independent   Home Management Independent   Shopping Independent   Prior Level Of Mobility Independent Without Device in Community;Independent With Steps in Community;Independent Without Device in Home;Independent With Steps in Home   Driving / Transportation Driving Independent   History of Falls   History of Falls No   Vitals   Pulse Oximetry 95 %   O2 (LPM) 0   O2 Delivery Device Room air w/o2 available   Pain   Intervention Declines   Cognition    Cognition / Consciousness WDL   Level of Consciousness Alert   Passive ROM Upper Body   Passive ROM Upper Body WDL   Active ROM Upper Body   Active ROM Upper Body  WDL   Dominant Hand Right   Strength Upper Body   Upper Body Strength  WDL   Upper Body Muscle Tone   Upper Body Muscle Tone  WDL   Coordination Upper Body   Coordination WDL   Balance Assessment   Sitting Balance (Static) Good   Sitting Balance (Dynamic) Good   Standing Balance (Static) Fair +   Standing Balance (Dynamic) Fair +   Weight Shift Sitting Good   Weight Shift Standing Good   Bed Mobility    Supine to Sit Independent   Sit to Supine Independent   Scooting Independent   ADL Assessment   Grooming Standing;Independent   Upper Body Dressing Independent   Lower Body Dressing Independent   Toileting Independent   How much help from another person does the patient currently need...   Putting on and taking off regular lower body clothing? 4   Bathing (including washing, rinsing,  and drying)? 4   Toileting, which includes using a toilet, bedpan, or urinal? 4   Putting on and taking off regular upper body clothing? 4   Taking care of personal grooming such as brushing teeth? 4   Eating meals? 4   6 Clicks Daily Activity Score 24   Functional Mobility   Sit to Stand Supervised   Bed, Chair, Wheelchair Transfer Supervised   Toilet Transfers Supervised   Transfer Method Stand Step   Mobility Ambulation w/out a device   Distance (Feet) 25   # of Times Distance was Traveled 2   Edema / Skin Assessment   Edema / Skin  Not Assessed   Education Group   Education Provided Home Safety;Transfers;Role of Occupational Therapist;Activities of Daily Living   Role of Occupational Therapist Patient Response Patient;Acceptance;;Explanation;Verbal Demonstration   Home Safety Patient Response Patient;Acceptance;Explanation;Demonstration;;Verbal Demonstration   Transfers Patient Response Patient;Acceptance;Explanation;Demonstration;;Teach Back;Verbal Demonstration;Action Demonstration   ADL Patient Response Patient;Acceptance;Explanation;Demonstration;;Teach Back;Verbal Demonstration;Action Demonstration   Anticipated Discharge Equipment and Recommendations   DC Equipment Recommendations None   Discharge Recommendations Anticipate that the patient will have no further occupational therapy needs after discharge from the hospital

## 2024-08-28 NOTE — CARE PLAN
The patient is Stable - Low risk of patient condition declining or worsening    Shift Goals  Clinical Goals: monitor sodium  Patient Goals: decreased nausea  Family Goals: updates    Progress made toward(s) clinical / shift goals:    Problem: Knowledge Deficit - Standard  Goal: Patient and family/care givers will demonstrate understanding of plan of care, disease process/condition, diagnostic tests and medications  Description: Target End Date:  1-3 days or as soon as patient condition allows    Document in Patient Education    1.  Patient and family/caregiver oriented to unit, equipment, visitation policy and means for communicating concern  2.  Complete/review Learning Assessment  3.  Assess knowledge level of disease process/condition, treatment plan, diagnostic tests and medications  4.  Explain disease process/condition, treatment plan, diagnostic tests and medications  Outcome: Progressing  Note: Plan of care discussed with patient and family. Patient and family understand the plan of care     Problem: Pain - Standard  Goal: Alleviation of pain or a reduction in pain to the patient’s comfort goal  Description: Target End Date:  Prior to discharge or change in level of care    Document on Vitals flowsheet    1.  Document pain using the appropriate pain scale per order or unit policy  2.  Educate and implement non-pharmacologic comfort measures (i.e. relaxation, distraction, massage, cold/heat therapy, etc.)  3.  Pain management medications as ordered  4.  Reassess pain after pain med administration per policy  5.  If opiods administered assess patient's response to pain medication is appropriate per POSS sedation scale  6.  Follow pain management plan developed in collaboration with patient and interdisciplinary team (including palliative care or pain specialists if applicable)  Outcome: Progressing  Note: Patient remained free of pain. Patient did not require pain medication.      Problem: Fall Risk  Goal:  Patient will remain free from falls  Description: Target End Date:  Prior to discharge or change in level of care    Document interventions on the Yajaira Vasquez Fall Risk Assessment    1.  Assess for fall risk factors  2.  Implement fall precautions  Outcome: Progressing  Note: Patient remains free of falls this shift.        Patient is not progressing towards the following goals:

## 2024-08-28 NOTE — CONSULTS
DATE OF SERVICE:  08/28/2024     REQUESTING PHYSICIAN:  Kin Gutierrez MD     REASON FOR CONSULTATION:  Hyponatremia.     The patient seen and examined, medical records were reviewed.     HISTORY OF PRESENT ILLNESS:  The patient is a very pleasant 64-year-old   Czech speaking lady, communication was through the  from the iPad   systems, presented to the hospital yesterday with nausea and vomiting for the   last 3 days with decreased p.o. intake, the patient also reports orthostatic   hypotension symptoms including lightheadedness and dizziness, the patient was   found to be hyponatremic with initial sodium at 123.  The patient was treated   with IV fluid, but her sodium went down to 122.     The patient has no headache or change in vision.  She still has occasional   nausea.     The patient has a history of hypertension. She was taking antihypertensive   medication at home including chlorthalidone.     This morning, the patient has no chest pain or shortness of breath.     PAST MEDICAL HISTORY:  Significant for hypertension.     ALLERGIES:  REPORTED ALLERGY TO PENICILLIN.     SOCIAL HISTORY:  The patient has no smoking history.     FAMILY HISTORY:  No known renal disease.     MEDICATIONS:  Reviewed.     REVIEW OF SYSTEMS:  All systems were reviewed; they were negative except   outlined in the history of present illness.     PHYSICAL EXAMINATION:    GENERAL:  The patient appears ill, but no apparent distress.  VITAL SIGNS:  Showed blood pressure of 132/70, heart rate was 61, respiratory   rate was 18.  HEENT:  Normocephalic, atraumatic.  Sclerae are anicteric.  Pupils are   reactive.  Nose normal.  Mucous membranes moist.  NECK:  No lymphadenopathy, no JVD, no thyroid mass.  CHEST:  Normal.  LUNGS:  Clear to auscultation.  HEART:  S1, S2.  ABDOMEN:  Soft, nontender.  No hepatosplenomegaly.  There is no inguinal   lymphadenopathy.  EXTREMITIES:  There is no lower extremity edema.  SKIN:  No skin  rash.  NEUROLOGIC:  The patient is alert and oriented x3.  MOOD:  The patient is anxious.     LABORATORY DATA:  Her recent labs from today were reviewed.     DIAGNOSTIC DATA:  The patient had abdominal CT scan done yesterday, I reviewed   the image myself.  Her kidneys have no hydronephrosis.     ASSESSMENT:  1.  Hyponatremia.  The etiology is most likely volume depletion.  Her slight   decrease in sodium after IV fluid is probably suggestive of high amount of ADH   secondary to hypotension.  Also, there is a component of the patient was on   hydrochlorothiazide.  2.  Hypokalemia.  3.  Nausea and vomiting.  4.  Hypertension.     PLAN:  1.  There is no acute need for renal placement therapy.  2.  Continue to hold chlorthalidone.  3.  Continue fluid resuscitation with normal saline until the patient is able   to take oral diet.  4.  Serial sodium level check.  5.  Avoid fast correction.  6.  Daily labs.  7.  Prognosis is guarded.  8.  Plan discussed in detail with Dr. Jackson Gutierrez.        ______________________________  FADI NAJJAR, MD FN/SHAUN    DD:  08/28/2024 14:30  DT:  08/28/2024 14:45    Job#:  [id number]

## 2024-08-28 NOTE — CARE PLAN
The patient is Stable - Low risk of patient condition declining or worsening    Shift Goals  Clinical Goals: Monitor sodium  Patient Goals: Reduce nausea  Family Goals: updates    Progress made toward(s) clinical / shift goals:    Problem: Knowledge Deficit - Standard  Goal: Patient and family/care givers will demonstrate understanding of plan of care, disease process/condition, diagnostic tests and medications  Outcome: Progressing  Note: Reviewed POC ; discussed and provided eduction on pain management, medications, IV fluids, admission process, and labs.      Problem: Pain - Standard  Goal: Alleviation of pain or a reduction in pain to the patient’s comfort goal  Outcome: Progressing  Note: Discussed pharmacological and non-pharmacological pain management interventions.      Problem: Fall Risk  Goal: Patient will remain free from falls  Outcome: Progressing  Note: Fall precautions implemented.        Patient is not progressing towards the following goals:

## 2024-08-28 NOTE — PROGRESS NOTES
Telemetry Shift Summary    Rhythm SR   HR Range 60  Ectopy n/a  Measurements 0.16/0.08/0.42    Normal Values  Rhythm SR  HR Range:   Measurements: 0.12-0.20/0.06-0.10/0.30-0.52

## 2024-08-28 NOTE — PROGRESS NOTES
4 Eyes Skin Assessment Completed by KAIDEN Flores and KAIDEN Rodriguez.    Head WDL  Ears WDL  Nose WDL  Mouth WDL  Neck WDL  Breast/Chest WDL  Shoulder Blades WDL  Spine WDL  (R) Arm/Elbow/Hand WDL  (L) Arm/Elbow/Hand WDL  Abdomen WDL  Groin WDL  Scrotum/Coccyx/Buttocks WDL  (R) Leg Scar  (L) Leg WDL  (R) Heel/Foot/Toe Scar  (L) Heel/Foot/Toe WDL          Devices In Places Tele Box and Blood Pressure Cuff      Interventions In Place Pillows    Possible Skin Injury No    Pictures Uploaded Into Epic N/A  Wound Consult Placed N/A  RN Wound Prevention Protocol Ordered No

## 2024-08-28 NOTE — PROGRESS NOTES
"Hospital Medicine Daily Progress Note    Date of Service  8/28/2024    Chief Complaint  Rebecca Zamora is a 64 y.o. female admitted 8/27/2024 with generalized weakness    Hospital Course  As per chart review:  \"64 y.o. female with a past medical history of primary hypertension and hyperlipidemia who presented 8/27/2024 with generalized weakness, anorexia, headache, dizziness and nausea and vomiting.  The patient has not been feeling well over the past 4 days.  She has been having progressive worsening generalized weakness dizziness with loss of appetite.  She denies having abdominal pain or diarrhea.  She denies noticing any blood in her vomitus.  In the emergency room she was found to have marked dehydration and hyponatremia with a sodium level of 123.\"    Interval Problem Update  8/28: Patient seen at bedside this morning.  Patient laying in bed, still complaining of generalized weakness, no appetite.  Still with hyponatremia.  Will continue with IV fluids.  We have consulted nephrology, we appreciate further recommendations.  We have also ordered urine sodium and urine osmolality.    I have discussed this patient's plan of care and discharge plan at IDT rounds today with Case Management, Nursing, Nursing leadership, and other members of the IDT team.    Consultants/Specialty  nephrology    Code Status  Full Code    Disposition  The patient is not medically cleared for discharge to home or a post-acute facility.        Review of Systems  Review of Systems   Constitutional:  Positive for malaise/fatigue. Negative for chills and fever.   HENT:  Negative for hearing loss and nosebleeds.    Eyes:  Negative for blurred vision and double vision.   Respiratory:  Negative for cough and shortness of breath.    Cardiovascular:  Negative for chest pain and palpitations.   Gastrointestinal:  Positive for nausea. Negative for abdominal pain and heartburn.        No appetite   Genitourinary:  Negative for dysuria and " urgency.   Musculoskeletal:  Negative for back pain and falls.   Skin:  Negative for itching and rash.   Neurological:  Positive for weakness (generalized). Negative for headaches.   Psychiatric/Behavioral:  Negative for substance abuse. The patient is not nervous/anxious.    All other systems reviewed and are negative.       Physical Exam  Temp:  [36.8 °C (98.2 °F)-36.9 °C (98.5 °F)] 36.8 °C (98.3 °F)  Pulse:  [55-65] 61  Resp:  [16-20] 18  BP: (121-149)/(67-88) 132/70  SpO2:  [95 %-98 %] 96 %    Physical Exam  Vitals and nursing note reviewed.   Constitutional:       General: She is not in acute distress.     Appearance: She is ill-appearing.   HENT:      Head: Normocephalic and atraumatic.      Right Ear: External ear normal.      Left Ear: External ear normal.      Mouth/Throat:      Pharynx: No oropharyngeal exudate or posterior oropharyngeal erythema.   Eyes:      General:         Right eye: No discharge.         Left eye: No discharge.   Cardiovascular:      Rate and Rhythm: Normal rate and regular rhythm.      Pulses: Normal pulses.      Heart sounds: No murmur heard.     No gallop.   Pulmonary:      Effort: Pulmonary effort is normal. No respiratory distress.      Breath sounds: Normal breath sounds. No wheezing or rhonchi.   Abdominal:      General: Bowel sounds are normal. There is no distension.      Palpations: Abdomen is soft.      Tenderness: There is no abdominal tenderness. There is no guarding.   Musculoskeletal:         General: No swelling or tenderness. Normal range of motion.      Cervical back: Normal range of motion and neck supple. No tenderness.   Skin:     General: Skin is warm and dry.   Neurological:      General: No focal deficit present.      Mental Status: She is alert and oriented to person, place, and time. Mental status is at baseline.      Motor: No weakness.   Psychiatric:         Mood and Affect: Mood normal.         Behavior: Behavior normal.         Fluids    Intake/Output  Summary (Last 24 hours) at 8/28/2024 1318  Last data filed at 8/28/2024 1142  Gross per 24 hour   Intake 295.48 ml   Output 500 ml   Net -204.52 ml        Laboratory  Recent Labs     08/27/24  1032 08/28/24  0144   WBC 6.7 6.8   RBC 4.52 4.16*   HEMOGLOBIN 14.2 13.0   HEMATOCRIT 38.7 34.9*   MCV 85.6 83.9   MCH 31.4 31.3   MCHC 36.7* 37.2*   RDW 36.8 36.4   PLATELETCT 273 216   MPV 11.0 10.3     Recent Labs     08/27/24  2213 08/28/24  0144 08/28/24  0700   SODIUM 122* 124* 123*   POTASSIUM 3.7 3.6 3.5*   CHLORIDE 88* 89* 91*   CO2 21 21 18*   GLUCOSE 107* 98 110*   BUN 11 9 9   CREATININE 0.65 0.62 0.59   CALCIUM 8.5 8.6 8.5                   Imaging  CT-RENAL COLIC EVALUATION(A/P W/O)   Final Result         1. There is no renal or definite ureteral calculus. There is no hydronephrosis.   2. Atherosclerosis including coronary artery disease.           Assessment/Plan  * Hyponatremia- (present on admission)  Assessment & Plan  Hypovolemic hyponatremia, losartan and chlorthalidone use    8/28: I suspect the patient with hypovolemic hyponatremia.  We have consulted nephrology, appreciate for recommendations.  For now we will continue to monitor BMP and continue with IV fluids.    Advance care planning  Assessment & Plan  8/28: I discussed with patient for at least 16 minutes further goals of care.  This included CODE STATUS which includes full code and DNR/DNI.  Also discussed further treatment goals.  For now the patient would like to have full treatment options.  This includes invasive and noninvasive procedures as needed.  In the event that the patient cannot make decisions for herself she would like her son Kali to make decisions for her.    Hypomagnesemia  Assessment & Plan  Replace as needed  monitor    Bradycardia- (present on admission)  Assessment & Plan  Has dizziness but this is likely secondary to severe dehydration.    8/28: Continue monitor on telemetry    Primary hypertension- (present on  admission)  Assessment & Plan  I will resume propranolol with hold parameters.  I will start as needed hydralazine for extreme hypertension.  I will hold home losartan and chlorthalidone for now given her hyponatremia and dehydration    Hypokalemia- (present on admission)  Assessment & Plan  Replace as needed  monitor    Dehydration- (present on admission)  Assessment & Plan  Likely secondary to reduced oral intake, and increase in insensible loss secondary to vomiting  Encourage oral intake as tolerated, antiemetics as needed.  Intravenous hydration until adequate oral intake is achieved.          VTE prophylaxis: Xarelto    I have performed a physical exam and reviewed and updated ROS and Plan today (8/28/2024). In review of yesterday's note (8/27/2024), there are no changes except as documented above.      I spend at least 51 minutes providing care for this patient.  This included face-to-face interview, physical examination.  Review of lab work including CBC, BMP, magnesium, CMP.  Consulting and discussing with nephrology.  Discussing with multidisciplinary team including case management, nursing staff and pharmacy.  Creating plan of care, reviewing orders.    Moreover, I discussed with patient for at least 16 minutes further goals of care.  This included CODE STATUS which includes full code and DNR/DNI.  Also discussed further treatment goals.  For now the patient would like to have full treatment options.  This includes invasive and noninvasive procedures as needed.  In the event that the patient cannot make decisions for herself she would like her son Kali to make decisions for her.

## 2024-08-28 NOTE — PROGRESS NOTES
Received report from Jennifer RICHEY.     Bed is locked and in lowest position. Fall and Safety precautions in place. Patient awake in bed. Call light within reach. Patent verbalizes No other needs at this time.

## 2024-08-28 NOTE — CARE PLAN
The patient is Stable - Low risk of patient condition declining or worsening    Shift Goals  Clinical Goals: Monitor sodium, controll nausea  Patient Goals: increase appetite, decrease nausea  Family Goals: updates    Progress made toward(s) clinical / shift goals:    Problem: Knowledge Deficit - Standard  Goal: Patient and family/care givers will demonstrate understanding of plan of care, disease process/condition, diagnostic tests and medications  Outcome: Progressing  Note: Reviewed POC; discussed and provided education on IV fluids, Labs, medications, and telemetry monitoring.      Problem: Fall Risk  Goal: Patient will remain free from falls  Outcome: Progressing  Note: Fall precautions in place. Patient free from falls.        Patient is not progressing towards the following goals:

## 2024-08-28 NOTE — THERAPY
Physical Therapy Contact Note    Patient Name: Rebecca Zamora  Age:  64 y.o., Sex:  female  Medical Record #: [id number]  Today's Date: 8/28/2024    PT orders received and chart reviewed. After discussion with RN and OT staff,  the patient currently does not demonstrate or present with any deficits that require the need for skilled acute physical therapy services in the acute care setting at this time.   Orders will be completed at this time.   Please reorder if there is a change in the patient's current medical status that will require skilled PT intervention.   Thank you.

## 2024-08-28 NOTE — PROGRESS NOTES
System downtime:     At 0840: Per MD Paz Verbal order, resume continuous IV NS 0.9% at 75 ml/hr and Q4 Sodium lab draws.

## 2024-08-28 NOTE — ASSESSMENT & PLAN NOTE
8/28: I discussed with patient for at least 16 minutes further goals of care.  This included CODE STATUS which includes full code and DNR/DNI.  Also discussed further treatment goals.  For now the patient would like to have full treatment options.  This includes invasive and noninvasive procedures as needed.  In the event that the patient cannot make decisions for herself she would like her son Kali to make decisions for her.

## 2024-08-28 NOTE — PROGRESS NOTES
System Downtime:    At 0700: Received report from Jennifer RICHEY.   Bed is locked and in lowest position. Fall and Safety precautions in place. Reviewed POC. Call light within reach. Patient verbalizes No other needs at this time.

## 2024-08-29 LAB
ANION GAP SERPL CALC-SCNC: 10 MMOL/L (ref 7–16)
ANION GAP SERPL CALC-SCNC: 10 MMOL/L (ref 7–16)
ANION GAP SERPL CALC-SCNC: 12 MMOL/L (ref 7–16)
ANION GAP SERPL CALC-SCNC: 14 MMOL/L (ref 7–16)
BUN SERPL-MCNC: 7 MG/DL (ref 8–22)
BUN SERPL-MCNC: 8 MG/DL (ref 8–22)
BUN SERPL-MCNC: 8 MG/DL (ref 8–22)
BUN SERPL-MCNC: 9 MG/DL (ref 8–22)
CALCIUM SERPL-MCNC: 8.1 MG/DL (ref 8.4–10.2)
CALCIUM SERPL-MCNC: 8.1 MG/DL (ref 8.4–10.2)
CALCIUM SERPL-MCNC: 8.2 MG/DL (ref 8.4–10.2)
CALCIUM SERPL-MCNC: 8.4 MG/DL (ref 8.4–10.2)
CHLORIDE SERPL-SCNC: 95 MMOL/L (ref 96–112)
CHLORIDE SERPL-SCNC: 95 MMOL/L (ref 96–112)
CHLORIDE SERPL-SCNC: 97 MMOL/L (ref 96–112)
CHLORIDE SERPL-SCNC: 98 MMOL/L (ref 96–112)
CO2 SERPL-SCNC: 18 MMOL/L (ref 20–33)
CO2 SERPL-SCNC: 20 MMOL/L (ref 20–33)
CO2 SERPL-SCNC: 21 MMOL/L (ref 20–33)
CO2 SERPL-SCNC: 22 MMOL/L (ref 20–33)
CREAT SERPL-MCNC: 0.58 MG/DL (ref 0.5–1.4)
CREAT SERPL-MCNC: 0.59 MG/DL (ref 0.5–1.4)
CREAT SERPL-MCNC: 0.6 MG/DL (ref 0.5–1.4)
CREAT SERPL-MCNC: 0.82 MG/DL (ref 0.5–1.4)
EKG IMPRESSION: NORMAL
ERYTHROCYTE [DISTWIDTH] IN BLOOD BY AUTOMATED COUNT: 40.6 FL (ref 35.9–50)
GFR SERPLBLD CREATININE-BSD FMLA CKD-EPI: 100 ML/MIN/1.73 M 2
GFR SERPLBLD CREATININE-BSD FMLA CKD-EPI: 100 ML/MIN/1.73 M 2
GFR SERPLBLD CREATININE-BSD FMLA CKD-EPI: 101 ML/MIN/1.73 M 2
GFR SERPLBLD CREATININE-BSD FMLA CKD-EPI: 80 ML/MIN/1.73 M 2
GLUCOSE SERPL-MCNC: 105 MG/DL (ref 65–99)
GLUCOSE SERPL-MCNC: 110 MG/DL (ref 65–99)
GLUCOSE SERPL-MCNC: 122 MG/DL (ref 65–99)
GLUCOSE SERPL-MCNC: 90 MG/DL (ref 65–99)
HCT VFR BLD AUTO: 39.4 % (ref 37–47)
HGB BLD-MCNC: 13.7 G/DL (ref 12–16)
MAGNESIUM SERPL-MCNC: 2.1 MG/DL (ref 1.5–2.5)
MCH RBC QN AUTO: 31.5 PG (ref 27–33)
MCHC RBC AUTO-ENTMCNC: 34.8 G/DL (ref 32.2–35.5)
MCV RBC AUTO: 90.6 FL (ref 81.4–97.8)
OSMOLALITY UR: 470 MOSM/KG H2O (ref 300–900)
PLATELET # BLD AUTO: 185 K/UL (ref 164–446)
PMV BLD AUTO: 10.9 FL (ref 9–12.9)
POTASSIUM SERPL-SCNC: 3.8 MMOL/L (ref 3.6–5.5)
POTASSIUM SERPL-SCNC: 4 MMOL/L (ref 3.6–5.5)
POTASSIUM SERPL-SCNC: 4 MMOL/L (ref 3.6–5.5)
POTASSIUM SERPL-SCNC: 5.2 MMOL/L (ref 3.6–5.5)
RBC # BLD AUTO: 4.35 M/UL (ref 4.2–5.4)
SODIUM SERPL-SCNC: 125 MMOL/L (ref 135–145)
SODIUM SERPL-SCNC: 128 MMOL/L (ref 135–145)
SODIUM SERPL-SCNC: 129 MMOL/L (ref 135–145)
SODIUM SERPL-SCNC: 129 MMOL/L (ref 135–145)
SODIUM SERPL-SCNC: 130 MMOL/L (ref 135–145)
SODIUM UR-SCNC: 104 MMOL/L
WBC # BLD AUTO: 6 K/UL (ref 4.8–10.8)

## 2024-08-29 PROCEDURE — 84295 ASSAY OF SERUM SODIUM: CPT

## 2024-08-29 PROCEDURE — 770020 HCHG ROOM/CARE - TELE (206)

## 2024-08-29 PROCEDURE — 93010 ELECTROCARDIOGRAM REPORT: CPT | Performed by: INTERNAL MEDICINE

## 2024-08-29 PROCEDURE — 85027 COMPLETE CBC AUTOMATED: CPT

## 2024-08-29 PROCEDURE — 83735 ASSAY OF MAGNESIUM: CPT

## 2024-08-29 PROCEDURE — 99232 SBSQ HOSP IP/OBS MODERATE 35: CPT | Performed by: INTERNAL MEDICINE

## 2024-08-29 PROCEDURE — A9270 NON-COVERED ITEM OR SERVICE: HCPCS | Performed by: HOSPITALIST

## 2024-08-29 PROCEDURE — 700111 HCHG RX REV CODE 636 W/ 250 OVERRIDE (IP): Performed by: HOSPITALIST

## 2024-08-29 PROCEDURE — 93005 ELECTROCARDIOGRAM TRACING: CPT | Performed by: INTERNAL MEDICINE

## 2024-08-29 PROCEDURE — 80048 BASIC METABOLIC PNL TOTAL CA: CPT | Mod: 91

## 2024-08-29 PROCEDURE — 700102 HCHG RX REV CODE 250 W/ 637 OVERRIDE(OP): Performed by: INTERNAL MEDICINE

## 2024-08-29 PROCEDURE — A9270 NON-COVERED ITEM OR SERVICE: HCPCS | Performed by: INTERNAL MEDICINE

## 2024-08-29 PROCEDURE — 94760 N-INVAS EAR/PLS OXIMETRY 1: CPT

## 2024-08-29 PROCEDURE — 700102 HCHG RX REV CODE 250 W/ 637 OVERRIDE(OP): Performed by: HOSPITALIST

## 2024-08-29 PROCEDURE — 36415 COLL VENOUS BLD VENIPUNCTURE: CPT

## 2024-08-29 PROCEDURE — 700105 HCHG RX REV CODE 258: Performed by: INTERNAL MEDICINE

## 2024-08-29 RX ORDER — LOSARTAN POTASSIUM 25 MG/1
25 TABLET ORAL EVERY EVENING
Status: DISCONTINUED | OUTPATIENT
Start: 2024-08-29 | End: 2024-08-30 | Stop reason: HOSPADM

## 2024-08-29 RX ORDER — LOSARTAN POTASSIUM 25 MG/1
25-50 TABLET ORAL 2 TIMES DAILY
Status: DISCONTINUED | OUTPATIENT
Start: 2024-08-29 | End: 2024-08-29

## 2024-08-29 RX ORDER — LOSARTAN POTASSIUM 50 MG/1
50 TABLET ORAL
Status: DISCONTINUED | OUTPATIENT
Start: 2024-08-30 | End: 2024-08-30 | Stop reason: HOSPADM

## 2024-08-29 RX ADMIN — SODIUM CHLORIDE: 9 INJECTION, SOLUTION INTRAVENOUS at 19:32

## 2024-08-29 RX ADMIN — RIVAROXABAN 10 MG: 10 TABLET, FILM COATED ORAL at 17:21

## 2024-08-29 RX ADMIN — SENNOSIDES AND DOCUSATE SODIUM 2 TABLET: 50; 8.6 TABLET ORAL at 17:21

## 2024-08-29 RX ADMIN — PROPRANOLOL HYDROCHLORIDE 5 MG: 10 TABLET ORAL at 17:23

## 2024-08-29 RX ADMIN — SODIUM CHLORIDE: 9 INJECTION, SOLUTION INTRAVENOUS at 05:05

## 2024-08-29 RX ADMIN — ONDANSETRON 4 MG: 4 TABLET, ORALLY DISINTEGRATING ORAL at 06:25

## 2024-08-29 RX ADMIN — LOSARTAN POTASSIUM 25 MG: 25 TABLET, FILM COATED ORAL at 17:22

## 2024-08-29 RX ADMIN — ONDANSETRON 4 MG: 4 TABLET, ORALLY DISINTEGRATING ORAL at 17:28

## 2024-08-29 RX ADMIN — SENNOSIDES AND DOCUSATE SODIUM 2 TABLET: 50; 8.6 TABLET ORAL at 06:13

## 2024-08-29 ASSESSMENT — ENCOUNTER SYMPTOMS
COUGH: 0
NAUSEA: 1
BACK PAIN: 0
HEADACHES: 0
FEVER: 0
VOMITING: 0
CHILLS: 0
FALLS: 0
ROS GI COMMENTS: NO APPETITE
SHORTNESS OF BREATH: 0
NERVOUS/ANXIOUS: 0
BLURRED VISION: 0
PALPITATIONS: 0
ABDOMINAL PAIN: 0
DOUBLE VISION: 0
HEARTBURN: 0
WEAKNESS: 1

## 2024-08-29 ASSESSMENT — LIFESTYLE VARIABLES: SUBSTANCE_ABUSE: 0

## 2024-08-29 ASSESSMENT — PAIN DESCRIPTION - PAIN TYPE
TYPE: ACUTE PAIN
TYPE: ACUTE PAIN

## 2024-08-29 NOTE — CARE PLAN
The patient is Stable - Low risk of patient condition declining or worsening    Shift Goals  Clinical Goals: Monitor sodium  Patient Goals: keep sodium up, maintain nausea, shower  Family Goals: updates    Progress made toward(s) clinical / shift goals:    Problem: Knowledge Deficit - Standard  Goal: Patient and family/care givers will demonstrate understanding of plan of care, disease process/condition, diagnostic tests and medications  8/29/2024 1528 by Sandra Silverman, R.N.  Outcome: Progressing  Note: Reviewed POC; discussed and provided education on labs, monitoring sodium levels, medications, 12 lead EKG, and IV fluids.   8/29/2024 1528 by Sandra Silverman, R.N.  Outcome: Progressing     Problem: Fall Risk  Goal: Patient will remain free from falls  Outcome: Progressing  Note: Fall precautions implemented.       Patient is not progressing towards the following goals:

## 2024-08-29 NOTE — HOSPITAL COURSE
"As per chart review:  \"64 y.o. female with a past medical history of primary hypertension and hyperlipidemia who presented 8/27/2024 with generalized weakness, anorexia, headache, dizziness and nausea and vomiting.  The patient has not been feeling well over the past 4 days.  She has been having progressive worsening generalized weakness dizziness with loss of appetite.  She denies having abdominal pain or diarrhea.  She denies noticing any blood in her vomitus.  In the emergency room she was found to have marked dehydration and hyponatremia with a sodium level of 123.\"    Patient was admitted for further management and care.  Upon further evaluation, the patient told me that she started feeling sick last Friday after eating some foods.  I suspect the patient could have had some viral gastroenteritis causing her to become dehydrated on top of her taking chlorthalidone.    On admission the patient with hyponatremia, the patient most likely with hypovolemic hyponatremia started on IV fluids.  Sodium continued to improve we consulted nephrology.     Today the day of discharge sodium was 129, the patient feels much better is able to tolerate p.o.  I discussed with nephrology and the patient can be discharged home.  The patient require close follow-up with PCP as an outpatient.  The patient is now from renal, however we will place referral to PCP in case she would like to stay in renal.  Otherwise she says that she will follow-up with her doctor.  Will also place an order to repeat BMP in a week in case she stays here in [place].    We are recommending the patient not to continue with her chlorthalidone.    The patient seen at bedside this morning, overall she feels much better and would like to go home.  Will discharge patient home.  The patient require close follow-up with PCP as an outpatient.    Also of note at 1 point telemetry was concern for ST elevation so we ordered a EKG, however the EKG did not report ST " elevation and the patient has never had any chest pain.

## 2024-08-29 NOTE — CARE PLAN
The patient is Stable - Low risk of patient condition declining or worsening    Shift Goals  Clinical Goals: Monitor sodium, control nausea/headache  Patient Goals: increase appetite, decrease nausea  Family Goals: updates    Progress made toward(s) clinical / shift goals:    Problem: Knowledge Deficit - Standard  Goal: Patient and family/care givers will demonstrate understanding of plan of care, disease process/condition, diagnostic tests and medications  Description: Target End Date:  1-3 days or as soon as patient condition allows    Document in Patient Education    1.  Patient and family/caregiver oriented to unit, equipment, visitation policy and means for communicating concern  2.  Complete/review Learning Assessment  3.  Assess knowledge level of disease process/condition, treatment plan, diagnostic tests and medications  4.  Explain disease process/condition, treatment plan, diagnostic tests and medications  Outcome: Progressing  Note: Discussed with patient and her family. Patient and family understand the plan of care.     Problem: Pain - Standard  Goal: Alleviation of pain or a reduction in pain to the patient’s comfort goal  Description: Target End Date:  Prior to discharge or change in level of care    Document on Vitals flowsheet    1.  Document pain using the appropriate pain scale per order or unit policy  2.  Educate and implement non-pharmacologic comfort measures (i.e. relaxation, distraction, massage, cold/heat therapy, etc.)  3.  Pain management medications as ordered  4.  Reassess pain after pain med administration per policy  5.  If opiods administered assess patient's response to pain medication is appropriate per POSS sedation scale  6.  Follow pain management plan developed in collaboration with patient and interdisciplinary team (including palliative care or pain specialists if applicable)  Outcome: Progressing  Note: Patient remained free of pain this this shift. Patient did not need any  pain medication.      Problem: Fall Risk  Goal: Patient will remain free from falls  Description: Target End Date:  Prior to discharge or change in level of care    Document interventions on the [facility] Fall Risk Assessment    1.  Assess for fall risk factors  2.  Implement fall precautions  Outcome: Progressing  Note: Patient remains free of falls. Patient calls for assistance when she needs to ambulate.        Patient is not progressing towards the following goals:

## 2024-08-29 NOTE — PROGRESS NOTES
At 0715: Received report from Jennifer RICHEY.     Bed is locked and in lowest position. Fall and Safety precautions in place. Reviewed POC. Call light within reach. Patient in bed, awake, and verbalizes No other needs at this time.

## 2024-08-29 NOTE — PROGRESS NOTES
Telemetry Shift Summary    Rhythm SR  HR Range 60-91  Ectopy n/a  Measurements 0.16/0.08/0.40    Normal Values  Rhythm SR  HR Range:   Measurements: 0.12-0.20/0.06-0.10/0.30-0.52

## 2024-08-29 NOTE — PROGRESS NOTES
Nephrology Daily Progress Note    Date of Service  8/29/2024    Chief Complaint  64 y.o. female admitted 8/27/2024 with nausea vomiting decreased p.o. intake, found to have hyponatremia   Interval Problem Update  Patient is doing better however still having mild nausea and mild headache  Communication was through an     Review of Systems  Review of Systems   Constitutional:  Negative for chills, fever and malaise/fatigue.   Respiratory:  Negative for cough and shortness of breath.    Cardiovascular:  Negative for chest pain and leg swelling.   Gastrointestinal:  Positive for nausea. Negative for vomiting.   Genitourinary:  Negative for dysuria, frequency and urgency.        Physical Exam  Temp:  [36.4 °C (97.6 °F)-37 °C (98.6 °F)] 36.4 °C (97.6 °F)  Pulse:  [51-65] 60  Resp:  [16-19] 19  BP: (113-134)/(70-82) 134/82  SpO2:  [93 %-96 %] 96 %    Physical Exam  Vitals and nursing note reviewed.   Constitutional:       General: She is not in acute distress.     Appearance: Normal appearance. She is well-developed. She is not diaphoretic.   HENT:      Head: Normocephalic and atraumatic.      Right Ear: External ear normal.      Left Ear: External ear normal.      Nose: Nose normal.   Eyes:      General: No scleral icterus.        Right eye: No discharge.         Left eye: No discharge.      Conjunctiva/sclera: Conjunctivae normal.   Cardiovascular:      Rate and Rhythm: Normal rate and regular rhythm.      Heart sounds: No murmur heard.  Pulmonary:      Effort: Pulmonary effort is normal. No respiratory distress.      Breath sounds: Normal breath sounds.   Musculoskeletal:         General: No tenderness.      Right lower leg: No edema.      Left lower leg: No edema.   Skin:     General: Skin is warm and dry.      Findings: No erythema.   Neurological:      General: No focal deficit present.      Mental Status: She is alert and oriented to person, place, and time.      Cranial Nerves: No cranial nerve deficit.  "  Psychiatric:         Mood and Affect: Mood normal.         Behavior: Behavior normal.         Fluids    Intake/Output Summary (Last 24 hours) at 8/29/2024 1627  Last data filed at 8/28/2024 1900  Gross per 24 hour   Intake 600 ml   Output --   Net 600 ml       Laboratory  Recent Labs     08/27/24  1032 08/28/24  0144 08/29/24  0147   WBC 6.7 6.8 6.0   RBC 4.52 4.16* 4.35   HEMOGLOBIN 14.2 13.0 13.7   HEMATOCRIT 38.7 34.9* 39.4   MCV 85.6 83.9 90.6   MCH 31.4 31.3 31.5   MCHC 36.7* 37.2* 34.8   RDW 36.8 36.4 40.6   PLATELETCT 273 216 185   MPV 11.0 10.3 10.9     Recent Labs     08/29/24  0623 08/29/24  1025 08/29/24  1424   SODIUM 130* 128* 129*   POTASSIUM 4.0 4.0 3.8   CHLORIDE 98 97 95*   CO2 22 21 20   GLUCOSE 105* 110* 122*   BUN 7* 8 9   CREATININE 0.59 0.60 0.82   CALCIUM 8.2* 8.1* 8.1*         No results for input(s): \"NTPROBNP\" in the last 72 hours.        Imaging  CT-RENAL COLIC EVALUATION(A/P W/O)   Final Result         1. There is no renal or definite ureteral calculus. There is no hydronephrosis.   2. Atherosclerosis including coronary artery disease.            Assessment/Plan  1 hyponatremia: Secondary to volume depletion, sodium improving slowly  2 nausea  Continue gentle fluid until better oral intake  Serial sodium level check  Plan discussed with Dr. Jackson Gutierrez            "

## 2024-08-29 NOTE — PROGRESS NOTES
Telemetry Shift Summary    Rhythm SB-SR  HR Range 48-66  Ectopy ST elevation in V1 & V2. Peak t waves  Measurements 0.16/0.08/0.40      Normal Values  Rhythm SR  HR Range:   Measurements: 0.12-0.20/0.06-0.10/0.30-0.52

## 2024-08-29 NOTE — PROGRESS NOTES
"Hospital Medicine Daily Progress Note    Date of Service  8/29/2024    Chief Complaint  eRbecca Zamora is a 64 y.o. female admitted 8/27/2024 with generalized weakness    Hospital Course  As per chart review:  \"64 y.o. female with a past medical history of primary hypertension and hyperlipidemia who presented 8/27/2024 with generalized weakness, anorexia, headache, dizziness and nausea and vomiting.  The patient has not been feeling well over the past 4 days.  She has been having progressive worsening generalized weakness dizziness with loss of appetite.  She denies having abdominal pain or diarrhea.  She denies noticing any blood in her vomitus.  In the emergency room she was found to have marked dehydration and hyponatremia with a sodium level of 123.\"    Interval Problem Update  8/28: Patient seen at bedside this morning.  Patient laying in bed, still complaining of generalized weakness, no appetite.  Still with hyponatremia.  Will continue with IV fluids.  We have consulted nephrology, we appreciate further recommendations.  We have also ordered urine sodium and urine osmolality.    8/29: Patient seen at bedside this morning.  Patient lying in bed, overall the patient feels better.  Sodium levels have improved.  We are pending final recommendations by nephrology, depending on the recommendations we could potentially discharge the patient.    Of note I received notification from nursing that telemetry was concern for ST elevation.  We ordered EKG which did not report ST elevation, the patient also denying any chest pain.    I have discussed this patient's plan of care and discharge plan at IDT rounds today with Case Management, Nursing, Nursing leadership, and other members of the IDT team.    Consultants/Specialty  nephrology    Code Status  Full Code    Disposition  The patient is not medically cleared for discharge to home or a post-acute facility.        Review of Systems  Review of Systems "   Constitutional:  Positive for malaise/fatigue. Negative for chills and fever.   HENT:  Negative for hearing loss and nosebleeds.    Eyes:  Negative for blurred vision and double vision.   Respiratory:  Negative for cough and shortness of breath.    Cardiovascular:  Negative for chest pain and palpitations.   Gastrointestinal:  Positive for nausea. Negative for abdominal pain and heartburn.        No appetite   Genitourinary:  Negative for dysuria and urgency.   Musculoskeletal:  Negative for back pain and falls.   Skin:  Negative for itching and rash.   Neurological:  Positive for weakness (generalized). Negative for headaches.   Psychiatric/Behavioral:  Negative for substance abuse. The patient is not nervous/anxious.    All other systems reviewed and are negative.       Physical Exam  Temp:  [36.5 °C (97.7 °F)-37 °C (98.6 °F)] 37 °C (98.6 °F)  Pulse:  [51-65] 57  Resp:  [16-19] 19  BP: (113-135)/(70-78) 113/77  SpO2:  [93 %-96 %] 96 %    Physical Exam  Vitals and nursing note reviewed.   Constitutional:       General: She is not in acute distress.     Appearance: She is ill-appearing.   HENT:      Head: Normocephalic and atraumatic.      Right Ear: External ear normal.      Left Ear: External ear normal.      Mouth/Throat:      Pharynx: No oropharyngeal exudate or posterior oropharyngeal erythema.   Eyes:      General:         Right eye: No discharge.         Left eye: No discharge.   Cardiovascular:      Rate and Rhythm: Normal rate and regular rhythm.      Pulses: Normal pulses.      Heart sounds: No murmur heard.     No gallop.   Pulmonary:      Effort: Pulmonary effort is normal. No respiratory distress.      Breath sounds: Normal breath sounds. No wheezing or rhonchi.   Abdominal:      General: Bowel sounds are normal. There is no distension.      Palpations: Abdomen is soft.      Tenderness: There is no abdominal tenderness. There is no guarding.   Musculoskeletal:         General: No swelling or  tenderness. Normal range of motion.      Cervical back: Normal range of motion and neck supple. No tenderness.   Skin:     General: Skin is warm and dry.   Neurological:      General: No focal deficit present.      Mental Status: She is alert and oriented to person, place, and time. Mental status is at baseline.      Motor: No weakness.   Psychiatric:         Mood and Affect: Mood normal.         Behavior: Behavior normal.         Fluids    Intake/Output Summary (Last 24 hours) at 8/29/2024 1238  Last data filed at 8/28/2024 1900  Gross per 24 hour   Intake 600 ml   Output --   Net 600 ml        Laboratory  Recent Labs     08/27/24  1032 08/28/24  0144 08/29/24  0147   WBC 6.7 6.8 6.0   RBC 4.52 4.16* 4.35   HEMOGLOBIN 14.2 13.0 13.7   HEMATOCRIT 38.7 34.9* 39.4   MCV 85.6 83.9 90.6   MCH 31.4 31.3 31.5   MCHC 36.7* 37.2* 34.8   RDW 36.8 36.4 40.6   PLATELETCT 273 216 185   MPV 11.0 10.3 10.9     Recent Labs     08/29/24  0147 08/29/24  0623 08/29/24  1025   SODIUM 125* 130* 128*   POTASSIUM 5.2 4.0 4.0   CHLORIDE 95* 98 97   CO2 18* 22 21   GLUCOSE 90 105* 110*   BUN 8 7* 8   CREATININE 0.58 0.59 0.60   CALCIUM 8.4 8.2* 8.1*                   Imaging  CT-RENAL COLIC EVALUATION(A/P W/O)   Final Result         1. There is no renal or definite ureteral calculus. There is no hydronephrosis.   2. Atherosclerosis including coronary artery disease.           Assessment/Plan  * Hyponatremia- (present on admission)  Assessment & Plan  Hypovolemic hyponatremia, losartan and chlorthalidone use    8/28: I suspect the patient with hypovolemic hyponatremia.  We have consulted nephrology, appreciate for recommendations.  For now we will continue to monitor BMP and continue with IV fluids.    8/29: Sodium seems to be improving.  We appreciate further recommendations per nephrology.    Generalized weakness  Assessment & Plan  PT/OT    Advance care planning  Assessment & Plan  8/28: I discussed with patient for at least 16 minutes  further goals of care.  This included CODE STATUS which includes full code and DNR/DNI.  Also discussed further treatment goals.  For now the patient would like to have full treatment options.  This includes invasive and noninvasive procedures as needed.  In the event that the patient cannot make decisions for herself she would like her son Kali to make decisions for her.    Hypomagnesemia  Assessment & Plan  Replace as needed  monitor    Bradycardia- (present on admission)  Assessment & Plan  Has dizziness but this is likely secondary to severe dehydration.    8/28: Continue monitor on telemetry    Primary hypertension- (present on admission)  Assessment & Plan  I will resume propranolol with hold parameters.  I will start as needed hydralazine for extreme hypertension.    8/29: Resume losartan and continue propranolol.  Continue to hold chlorthalidone.    Hypokalemia- (present on admission)  Assessment & Plan  Replace as needed  monitor    Dehydration- (present on admission)  Assessment & Plan  Likely secondary to reduced oral intake, and increase in insensible loss secondary to vomiting  Encourage oral intake as tolerated, antiemetics as needed.  Intravenous hydration until adequate oral intake is achieved.          VTE prophylaxis: Xarelto    I have performed a physical exam and reviewed and updated ROS and Plan today (8/29/2024). In review of yesterday's note (8/28/2024), there are no changes except as documented above.

## 2024-08-30 VITALS
HEART RATE: 57 BPM | DIASTOLIC BLOOD PRESSURE: 75 MMHG | HEIGHT: 60 IN | TEMPERATURE: 98.5 F | OXYGEN SATURATION: 94 % | SYSTOLIC BLOOD PRESSURE: 134 MMHG | BODY MASS INDEX: 25.32 KG/M2 | WEIGHT: 128.97 LBS | RESPIRATION RATE: 18 BRPM

## 2024-08-30 LAB
ANION GAP SERPL CALC-SCNC: 9 MMOL/L (ref 7–16)
BUN SERPL-MCNC: 9 MG/DL (ref 8–22)
CALCIUM SERPL-MCNC: 8.2 MG/DL (ref 8.4–10.2)
CHLORIDE SERPL-SCNC: 100 MMOL/L (ref 96–112)
CO2 SERPL-SCNC: 20 MMOL/L (ref 20–33)
CREAT SERPL-MCNC: 0.66 MG/DL (ref 0.5–1.4)
EST. AVERAGE GLUCOSE BLD GHB EST-MCNC: 117 MG/DL
GFR SERPLBLD CREATININE-BSD FMLA CKD-EPI: 98 ML/MIN/1.73 M 2
GLUCOSE SERPL-MCNC: 92 MG/DL (ref 65–99)
HBA1C MFR BLD: 5.7 % (ref 4–5.6)
POTASSIUM SERPL-SCNC: 3.7 MMOL/L (ref 3.6–5.5)
SODIUM SERPL-SCNC: 129 MMOL/L (ref 135–145)
SODIUM SERPL-SCNC: 131 MMOL/L (ref 135–145)

## 2024-08-30 PROCEDURE — 700105 HCHG RX REV CODE 258: Performed by: INTERNAL MEDICINE

## 2024-08-30 PROCEDURE — 700102 HCHG RX REV CODE 250 W/ 637 OVERRIDE(OP): Performed by: INTERNAL MEDICINE

## 2024-08-30 PROCEDURE — 83036 HEMOGLOBIN GLYCOSYLATED A1C: CPT

## 2024-08-30 PROCEDURE — 99239 HOSP IP/OBS DSCHRG MGMT >30: CPT | Performed by: INTERNAL MEDICINE

## 2024-08-30 PROCEDURE — 36415 COLL VENOUS BLD VENIPUNCTURE: CPT

## 2024-08-30 PROCEDURE — A9270 NON-COVERED ITEM OR SERVICE: HCPCS | Performed by: INTERNAL MEDICINE

## 2024-08-30 PROCEDURE — 700102 HCHG RX REV CODE 250 W/ 637 OVERRIDE(OP): Performed by: HOSPITALIST

## 2024-08-30 PROCEDURE — 84295 ASSAY OF SERUM SODIUM: CPT

## 2024-08-30 PROCEDURE — A9270 NON-COVERED ITEM OR SERVICE: HCPCS | Performed by: HOSPITALIST

## 2024-08-30 PROCEDURE — 80048 BASIC METABOLIC PNL TOTAL CA: CPT

## 2024-08-30 RX ORDER — ONDANSETRON 4 MG/1
4 TABLET, ORALLY DISINTEGRATING ORAL EVERY 8 HOURS PRN
Qty: 12 TABLET | Refills: 0 | Status: SHIPPED | OUTPATIENT
Start: 2024-08-30 | End: 2024-08-30

## 2024-08-30 RX ORDER — ONDANSETRON 4 MG/1
4 TABLET, ORALLY DISINTEGRATING ORAL EVERY 8 HOURS PRN
Qty: 12 TABLET | Refills: 0 | Status: SHIPPED | OUTPATIENT
Start: 2024-08-30 | End: 2024-09-03

## 2024-08-30 RX ADMIN — LOSARTAN POTASSIUM 50 MG: 25 TABLET, FILM COATED ORAL at 04:46

## 2024-08-30 RX ADMIN — SODIUM CHLORIDE: 9 INJECTION, SOLUTION INTRAVENOUS at 06:54

## 2024-08-30 RX ADMIN — SENNOSIDES AND DOCUSATE SODIUM 2 TABLET: 50; 8.6 TABLET ORAL at 04:49

## 2024-08-30 ASSESSMENT — PAIN DESCRIPTION - PAIN TYPE: TYPE: ACUTE PAIN

## 2024-08-30 ASSESSMENT — FIBROSIS 4 INDEX: FIB4 SCORE: 1.9

## 2024-08-30 NOTE — PROGRESS NOTES
Telemetry Shift Summary    Rhythm SR/SB  HR Range 58-68  Ectopy rPAC  Measurements 0.14/0.08/0.40    Normal Values  Rhythm SR  HR Range:   Measurements: 0.12-0.20/0.06-0.10/0.30-0.52

## 2024-08-30 NOTE — CARE PLAN
The patient is Stable - Low risk of patient condition declining or worsening    Shift Goals  Clinical Goals: monitor labs Na  Patient Goals: Stay warm, Rest  Family Goals: updates    Progress made toward(s) clinical / shift goals:    Problem: Knowledge Deficit - Standard  Goal: Patient and family/care givers will demonstrate understanding of plan of care, disease process/condition, diagnostic tests and medications  Outcome: Progressing     Problem: Pain - Standard  Goal: Alleviation of pain or a reduction in pain to the patient’s comfort goal  Outcome: Progressing     Problem: Fall Risk  Goal: Patient will remain free from falls  Outcome: Progressing       Patient is not progressing towards the following goals:

## 2024-08-30 NOTE — DISCHARGE SUMMARY
"Discharge Summary    CHIEF COMPLAINT ON ADMISSION  Chief Complaint   Patient presents with    N/V     For the last 3-4 days      Loss of Appetite     For about 3-4 days     Weakness     For about 3-4 days      Lightheadedness     For the last 3-4 days       Reason for Admission  Vomiting, Dizzy     Admission Date  8/27/2024    CODE STATUS  Full Code    HPI & HOSPITAL COURSE    As per chart review:  \"64 y.o. female with a past medical history of primary hypertension and hyperlipidemia who presented 8/27/2024 with generalized weakness, anorexia, headache, dizziness and nausea and vomiting.  The patient has not been feeling well over the past 4 days.  She has been having progressive worsening generalized weakness dizziness with loss of appetite.  She denies having abdominal pain or diarrhea.  She denies noticing any blood in her vomitus.  In the emergency room she was found to have marked dehydration and hyponatremia with a sodium level of 123.\"    Patient was admitted for further management and care.  Upon further evaluation, the patient told me that she started feeling sick last Friday after eating some foods.  I suspect the patient could have had some viral gastroenteritis causing her to become dehydrated on top of her taking chlorthalidone.    On admission the patient with hyponatremia, the patient most likely with hypovolemic hyponatremia started on IV fluids.  Sodium continued to improve we consulted nephrology.     Today the day of discharge sodium was 129, the patient feels much better is able to tolerate p.o.  I discussed with nephrology and the patient can be discharged home.  The patient require close follow-up with PCP as an outpatient.  The patient is now from renal, however we will place referral to PCP in case she would like to stay in renal.  Otherwise she says that she will follow-up with her doctor.  Will also place an order to repeat BMP in a week in case she stays here in [place].    We are " recommending the patient not to continue with her chlorthalidone.    The patient seen at bedside this morning, overall she feels much better and would like to go home.  Will discharge patient home.  The patient require close follow-up with PCP as an outpatient.    Also of note at 1 point telemetry was concern for ST elevation so we ordered a EKG, however the EKG did not report ST elevation and the patient has never had any chest pain.    A1c is pending as well.     Therefore, she is discharged in fair and stable condition to home with close outpatient follow-up.    The patient met 2-midnight criteria for an inpatient stay at the time of discharge.    Discharge Date  08/30/2024    FOLLOW UP ITEMS POST DISCHARGE  The patient will require close follow-up with PCP as an outpatient.  We have also placed order for repeat BMP in a week in case she stays here in [place].    DISCHARGE DIAGNOSES  Principal Problem:    Hyponatremia (POA: Yes)  Active Problems:    Dehydration (POA: Yes)    Hypokalemia (POA: Yes)    Primary hypertension (POA: Yes)    Bradycardia (POA: Yes)    Hypomagnesemia (POA: Unknown)    Advance care planning (POA: Unknown)    Generalized weakness (POA: Unknown)  Resolved Problems:    * No resolved hospital problems. *      FOLLOW UP    Primary Care Provider    Schedule an appointment as soon as possible for a visit        MEDICATIONS ON DISCHARGE     Medication List        CONTINUE taking these medications        Instructions   acetaminophen 500 MG Tabs  Commonly known as: Tylenol   Take 500-1,000 mg by mouth every 6 hours as needed. Indications: Pain  Dose: 500-1,000 mg     losartan 50 MG Tabs  Commonly known as: Cozaar   Take 25-50 mg by mouth 2 times a day. Pt takes 50MG in AM and 25MG in the evening  Dose: 25-50 mg     pregabalin 75 MG Caps  Commonly known as: Lyrica   Take 75 mg by mouth every evening.  Dose: 75 mg     propranolol 10 MG Tabs  Commonly known as: Inderal   Take 5 mg by mouth every  "evening.  Dose: 5 mg            STOP taking these medications      chlorthalidone 50 MG Tabs  Commonly known as: [place]              Allergies  Allergies   Allergen Reactions    Penicillins Unspecified     \"Buzzing in ears when she took it\"        DIET  Orders Placed This Encounter   Procedures    Diet Order Diet: Cardiac (1200 mL free water restriction, okay for solute-containing fluids like Gatorade); Fluid modifications: (optional): Free Water Restrictions Only     Standing Status:   Standing     Number of Occurrences:   1     Order Specific Question:   Diet:     Answer:   Cardiac [6]     Comments:   1200 mL free water restriction, okay for solute-containing fluids like Gatorade     Order Specific Question:   Fluid modifications: (optional)     Answer:   Free Water Restrictions Only [12]       ACTIVITY  As tolerated.  Weight bearing as tolerated    CONSULTATIONS  Nephrology    PROCEDURES      CT-RENAL COLIC EVALUATION(A/P W/O)   Final Result         1. There is no renal or definite ureteral calculus. There is no hydronephrosis.   2. Atherosclerosis including coronary artery disease.           LABORATORY  Lab Results   Component Value Date    SODIUM 129 (L) 08/30/2024    POTASSIUM 3.7 08/30/2024    CHLORIDE 100 08/30/2024    CO2 20 08/30/2024    GLUCOSE 92 08/30/2024    BUN 9 08/30/2024    CREATININE 0.66 08/30/2024        Lab Results   Component Value Date    WBC 6.0 08/29/2024    HEMOGLOBIN 13.7 08/29/2024    HEMATOCRIT 39.4 08/29/2024    PLATELETCT 185 08/29/2024        Total time of the discharge process exceeds 31 minutes.  "

## 2024-08-30 NOTE — PROGRESS NOTES
Telemetry Shift Summary     Rhythm: SB  HR Range:49  Measurements: 0.16/0.08/0.42    Normal Values  Measurements: 0.12- 0.20 / 0.08-0.10 / 0.30-0.52

## 2024-08-30 NOTE — CARE PLAN
The patient is Stable - Low risk of patient condition declining or worsening    Shift Goals  Clinical Goals: monitor labs Na  Patient Goals: Stay warm, Rest  Family Goals: updates    Progress made toward(s) clinical / shift goals:  Patient cleared for discharge with close follow-up. Patient educated on the importance of recheck Na outpatient and come back to ER if she feels worse. Family at bedside and understands dc education.       Problem: Knowledge Deficit - Standard  Goal: Patient and family/care givers will demonstrate understanding of plan of care, disease process/condition, diagnostic tests and medications  Outcome: Met     Problem: Pain - Standard  Goal: Alleviation of pain or a reduction in pain to the patient’s comfort goal  Outcome: Met     Problem: Fall Risk  Goal: Patient will remain free from falls  Outcome: Met       Patient is not progressing towards the following goals:

## 2024-08-30 NOTE — DISCHARGE PLANNING
DPA received hand off of FAP application to be faxed to [phone] for review. Manual fax sent 10:33

## 2024-08-30 NOTE — PROGRESS NOTES
Patient discharged home with family. IV removed and tele box removed and returned to monitor tech. Patient states all questions and concerns have been addressed appropriately. Verbalized understanding of discharge instructions and to follow up with PCP and get repeat lab work done in 1 week from discharge. New prescriptions sent to [facility]. Discharge education given to patient and family.     Patient off the unit with UC.

## 2025-02-26 ENCOUNTER — TELEPHONE (OUTPATIENT)
Dept: HEALTH INFORMATION MANAGEMENT | Facility: OTHER | Age: 65
End: 2025-02-26